# Patient Record
Sex: MALE | Race: WHITE | NOT HISPANIC OR LATINO | Employment: OTHER | URBAN - METROPOLITAN AREA
[De-identification: names, ages, dates, MRNs, and addresses within clinical notes are randomized per-mention and may not be internally consistent; named-entity substitution may affect disease eponyms.]

---

## 2018-04-25 ENCOUNTER — OFFICE VISIT CONVERTED (OUTPATIENT)
Dept: CARDIOLOGY | Facility: CLINIC | Age: 63
End: 2018-04-25
Attending: INTERNAL MEDICINE

## 2018-06-08 ENCOUNTER — OFFICE VISIT CONVERTED (OUTPATIENT)
Dept: OTHER | Facility: HOSPITAL | Age: 63
End: 2018-06-08
Attending: NURSE PRACTITIONER

## 2018-06-08 ENCOUNTER — CONVERSION ENCOUNTER (OUTPATIENT)
Dept: OTHER | Facility: HOSPITAL | Age: 63
End: 2018-06-08

## 2018-08-22 ENCOUNTER — OFFICE VISIT CONVERTED (OUTPATIENT)
Dept: CARDIOLOGY | Facility: CLINIC | Age: 63
End: 2018-08-22
Attending: INTERNAL MEDICINE

## 2018-09-06 ENCOUNTER — CONVERSION ENCOUNTER (OUTPATIENT)
Dept: OTHER | Facility: HOSPITAL | Age: 63
End: 2018-09-06

## 2018-09-06 ENCOUNTER — OFFICE VISIT CONVERTED (OUTPATIENT)
Dept: OTHER | Facility: HOSPITAL | Age: 63
End: 2018-09-06
Attending: NURSE PRACTITIONER

## 2019-02-27 ENCOUNTER — OFFICE VISIT CONVERTED (OUTPATIENT)
Dept: CARDIOLOGY | Facility: CLINIC | Age: 64
End: 2019-02-27
Attending: INTERNAL MEDICINE

## 2019-04-09 VITALS
TEMPERATURE: 98.2 F | DIASTOLIC BLOOD PRESSURE: 62 MMHG | DIASTOLIC BLOOD PRESSURE: 44 MMHG | OXYGEN SATURATION: 99 % | OXYGEN SATURATION: 98 % | DIASTOLIC BLOOD PRESSURE: 58 MMHG | RESPIRATION RATE: 20 BRPM | SYSTOLIC BLOOD PRESSURE: 78 MMHG | RESPIRATION RATE: 15 BRPM | SYSTOLIC BLOOD PRESSURE: 87 MMHG | WEIGHT: 180 LBS | DIASTOLIC BLOOD PRESSURE: 55 MMHG | HEART RATE: 63 BPM | DIASTOLIC BLOOD PRESSURE: 45 MMHG | SYSTOLIC BLOOD PRESSURE: 81 MMHG | HEART RATE: 67 BPM | RESPIRATION RATE: 16 BRPM | HEART RATE: 62 BPM | DIASTOLIC BLOOD PRESSURE: 63 MMHG | SYSTOLIC BLOOD PRESSURE: 92 MMHG | RESPIRATION RATE: 18 BRPM | SYSTOLIC BLOOD PRESSURE: 83 MMHG | DIASTOLIC BLOOD PRESSURE: 47 MMHG | SYSTOLIC BLOOD PRESSURE: 124 MMHG | OXYGEN SATURATION: 100 % | HEART RATE: 59 BPM | SYSTOLIC BLOOD PRESSURE: 116 MMHG | SYSTOLIC BLOOD PRESSURE: 108 MMHG | RESPIRATION RATE: 4 BRPM | HEART RATE: 60 BPM | HEART RATE: 64 BPM | DIASTOLIC BLOOD PRESSURE: 50 MMHG | DIASTOLIC BLOOD PRESSURE: 60 MMHG | DIASTOLIC BLOOD PRESSURE: 48 MMHG | SYSTOLIC BLOOD PRESSURE: 109 MMHG | HEART RATE: 58 BPM | RESPIRATION RATE: 17 BRPM | RESPIRATION RATE: 14 BRPM | SYSTOLIC BLOOD PRESSURE: 73 MMHG | HEART RATE: 61 BPM | TEMPERATURE: 97 F | HEIGHT: 72 IN

## 2019-04-11 ENCOUNTER — AMBULATORY SURGICAL CENTER (AMBULATORY)
Dept: URBAN - METROPOLITAN AREA SURGERY 17 | Facility: SURGERY | Age: 64
End: 2019-04-11
Payer: COMMERCIAL

## 2019-04-11 DIAGNOSIS — K64.8 OTHER HEMORRHOIDS: ICD-10-CM

## 2019-04-11 DIAGNOSIS — D12.5 BENIGN NEOPLASM OF SIGMOID COLON: ICD-10-CM

## 2019-04-11 DIAGNOSIS — K62.1 RECTAL POLYP: ICD-10-CM

## 2019-04-11 DIAGNOSIS — Z86.010 PERSONAL HISTORY OF COLONIC POLYPS: ICD-10-CM

## 2019-04-11 DIAGNOSIS — K57.30 DIVERTICULOSIS OF LARGE INTESTINE WITHOUT PERFORATION OR ABS: ICD-10-CM

## 2019-04-11 DIAGNOSIS — D12.2 BENIGN NEOPLASM OF ASCENDING COLON: ICD-10-CM

## 2019-04-11 LAB
GI HISTOLOGY: A. UNSPECIFIED: (no result)
GI HISTOLOGY: B. UNSPECIFIED: (no result)
GI HISTOLOGY: C. UNSPECIFIED: (no result)
GI HISTOLOGY: D. UNSPECIFIED: (no result)
GI HISTOLOGY: PDF REPORT: (no result)

## 2019-04-11 PROCEDURE — 45380 COLONOSCOPY AND BIOPSY: CPT | Mod: 59

## 2019-04-11 PROCEDURE — 45385 COLONOSCOPY W/LESION REMOVAL: CPT

## 2019-06-17 ENCOUNTER — OFFICE VISIT CONVERTED (OUTPATIENT)
Dept: OTHER | Facility: HOSPITAL | Age: 64
End: 2019-06-17
Attending: NURSE PRACTITIONER

## 2019-06-17 ENCOUNTER — HOSPITAL ENCOUNTER (OUTPATIENT)
Dept: OTHER | Facility: HOSPITAL | Age: 64
Discharge: HOME | End: 2019-06-17

## 2019-06-17 LAB
25(OH)D3 SERPL-MCNC: 27.4 NG/ML (ref 30–100)
ALBUMIN SERPL-MCNC: 4.4 G/DL (ref 3.5–5)
ALBUMIN/GLOB SERPL: 1.7 {RATIO} (ref 1.4–2.6)
ALP SERPL-CCNC: 86 U/L (ref 56–155)
ALT SERPL-CCNC: 24 U/L (ref 10–40)
ANION GAP SERPL CALC-SCNC: 15 MMOL/L (ref 8–19)
AST SERPL-CCNC: 20 U/L (ref 15–50)
BASOPHILS # BLD AUTO: 0.04 10*3/UL (ref 0–0.2)
BASOPHILS NFR BLD AUTO: 0.6 % (ref 0–3)
BILIRUB SERPL-MCNC: 0.37 MG/DL (ref 0.2–1.3)
BUN SERPL-MCNC: 13 MG/DL (ref 5–25)
BUN/CREAT SERPL: 19 {RATIO} (ref 6–20)
CALCIUM SERPL-MCNC: 9.1 MG/DL (ref 8.7–10.4)
CHLORIDE SERPL-SCNC: 102 MMOL/L (ref 99–111)
CHOLEST SERPL-MCNC: 139 MG/DL (ref 107–200)
CHOLEST/HDLC SERPL: 3.7 {RATIO} (ref 3–6)
CONV ABS IMM GRAN: 0.02 10*3/UL (ref 0–0.2)
CONV CO2: 26 MMOL/L (ref 22–32)
CONV IMMATURE GRAN: 0.3 % (ref 0–1.8)
CONV TOTAL PROTEIN: 7 G/DL (ref 6.3–8.2)
CREAT UR-MCNC: 0.7 MG/DL (ref 0.7–1.2)
DEPRECATED RDW RBC AUTO: 41.8 FL (ref 35.1–43.9)
EOSINOPHIL # BLD AUTO: 0.39 10*3/UL (ref 0–0.7)
EOSINOPHIL # BLD AUTO: 6 % (ref 0–7)
ERYTHROCYTE [DISTWIDTH] IN BLOOD BY AUTOMATED COUNT: 12.7 % (ref 11.6–14.4)
GFR SERPLBLD BASED ON 1.73 SQ M-ARVRAT: >60 ML/MIN/{1.73_M2}
GLOBULIN UR ELPH-MCNC: 2.6 G/DL (ref 2–3.5)
GLUCOSE SERPL-MCNC: 163 MG/DL (ref 70–99)
HBA1C MFR BLD: 13 G/DL (ref 14–18)
HCT VFR BLD AUTO: 40.8 % (ref 42–52)
HDLC SERPL-MCNC: 38 MG/DL (ref 40–60)
LDLC SERPL CALC-MCNC: 73 MG/DL (ref 70–100)
LYMPHOCYTES # BLD AUTO: 1.69 10*3/UL (ref 1–5)
MCH RBC QN AUTO: 28.7 PG (ref 27–31)
MCHC RBC AUTO-ENTMCNC: 31.9 G/DL (ref 33–37)
MCV RBC AUTO: 90.1 FL (ref 80–96)
MONOCYTES # BLD AUTO: 0.52 10*3/UL (ref 0.2–1.2)
MONOCYTES NFR BLD AUTO: 8 % (ref 3–10)
NEUTROPHILS # BLD AUTO: 3.87 10*3/UL (ref 2–8)
NEUTROPHILS NFR BLD AUTO: 59.2 % (ref 30–85)
NRBC CBCN: 0 % (ref 0–0.7)
OSMOLALITY SERPL CALC.SUM OF ELEC: 292 MOSM/KG (ref 273–304)
PLATELET # BLD AUTO: 289 10*3/UL (ref 130–400)
PMV BLD AUTO: 11.6 FL (ref 9.4–12.4)
POTASSIUM SERPL-SCNC: 4.3 MMOL/L (ref 3.5–5.3)
RBC # BLD AUTO: 4.53 10*6/UL (ref 4.7–6.1)
SODIUM SERPL-SCNC: 139 MMOL/L (ref 135–147)
TRIGL SERPL-MCNC: 141 MG/DL (ref 40–150)
TSH SERPL-ACNC: 1.49 M[IU]/L (ref 0.27–4.2)
VARIANT LYMPHS NFR BLD MANUAL: 25.9 % (ref 20–45)
VLDLC SERPL-MCNC: 28 MG/DL (ref 5–37)
WBC # BLD AUTO: 6.53 10*3/UL (ref 4.8–10.8)

## 2019-06-18 LAB
EST. AVERAGE GLUCOSE BLD GHB EST-MCNC: 105 MG/DL
HBA1C MFR BLD: 5.3 % (ref 3.5–5.7)

## 2019-11-27 ENCOUNTER — OFFICE VISIT CONVERTED (OUTPATIENT)
Dept: CARDIOLOGY | Facility: CLINIC | Age: 64
End: 2019-11-27
Attending: INTERNAL MEDICINE

## 2019-11-27 ENCOUNTER — CONVERSION ENCOUNTER (OUTPATIENT)
Dept: CARDIOLOGY | Facility: CLINIC | Age: 64
End: 2019-11-27

## 2020-01-07 ENCOUNTER — OFFICE VISIT CONVERTED (OUTPATIENT)
Dept: OTHER | Facility: HOSPITAL | Age: 65
End: 2020-01-07
Attending: NURSE PRACTITIONER

## 2020-01-07 ENCOUNTER — CONVERSION ENCOUNTER (OUTPATIENT)
Dept: OTHER | Facility: HOSPITAL | Age: 65
End: 2020-01-07

## 2020-11-30 ENCOUNTER — OFFICE VISIT CONVERTED (OUTPATIENT)
Dept: CARDIOLOGY | Facility: CLINIC | Age: 65
End: 2020-11-30
Attending: INTERNAL MEDICINE

## 2020-11-30 ENCOUNTER — CONVERSION ENCOUNTER (OUTPATIENT)
Dept: CARDIOLOGY | Facility: CLINIC | Age: 65
End: 2020-11-30

## 2021-05-13 NOTE — PROGRESS NOTES
Progress Note      Patient Name: Holly Nogueira   Patient ID: [id number]   Sex: Male   YOB: 1955    Primary Care Provider: lAejandra SIMPSON   Referring Provider: Sowmya SIMPSON    Visit Date: November 30, 2020    Provider: Diallo Novak MD   [facility]   Location Address: [address] [place] [address], [address]   SAMANTA Garcia  [id number]   Location Phone: [phone]          Chief Complaint  · Dizziness       History Of Present Illness  REFERRING PROVIDER: Sowmya SIMPSON   Holly Nogueira is a 65-year-old white male who states he had episodes of dizziness when his blood pressure went low so his Losartan was decreased about a month ago. The dizziness lasted about a week a nd he has not had any episodes since his blood pressure increased. He states it was running 150/50 range. Not it runs less than 140 systolic range. Denies any chest pain o pressure. No palpitations, shortness of breath. Has occasional swelling, but no syncope, PND, or orthopnea.   PAST MEDICAL HISTORY: Hypertension; prostate cancer; bleeding tendencies.   PSYCHOSOCIAL HISTORY: Rare alcohol use. Previously smoked but quit.   CURRENT MEDICATIONS: Carvedilol 12.5 mg 1-1/2 tablets b.i.d.; Losartan 100 mg 1/2 tablet daily; Lasix 20 mg daily; Nifedipine 90 mg daily; Pentoxifylline 400 mg t.i.d.; Atorvastatin 20 mg daily; Cialis 5 mg daily; vitamin D 2000 units daily; Cetirizine 10 mg daily; Ibuprofen 200 mg daily.      ALLERGIES:  No known drug allergies.       Review of Systems  · Cardiovascular  o Admits  o : swelling (feet, ankles, hands)  o Denies  o : palpitations (fast, fluttering, or skipping beats), shortness of breath while walking or lying flat, chest pain or angina pectoris   · Respiratory  o Denies  o : chronic or frequent cough      Vitals  Date Time BP Position Site L\R Cuff Size HR RR TEMP (F) WT  HT  BMI kg/m2 BSA m2 O2 Sat FR L/min FiO2        11/30/2020 08:08 /76 Sitting    66 - R    195lbs 16oz 6'   26.58 2.13             Physical Examination  · Constitutional  o Appearance  o : Awake, alert, in no acute distress.  · Eyes  o Conjunctivae  o : Conjunctivae normal.  · Ears, Nose, Mouth and Throat  o Oral Cavity  o :   § Oral Mucosa  § : Normal.  · Neck  o Jugular Veins  o : No JVD. Good carotid upstroke. No bruits noted.  · Respiratory  o Respiratory  o : Good respiratory effort. Clear to percussion and auscultation.  · Cardiovascular  o Heart  o : PMI is not well felt. S1, S2 regular. No S3. S4+. There is a 1/6 systolic murmur at the apex. Negative diastolic murmur. No ectopic beats.  o Peripheral Vascular System  o :   § Extremities  § : Good femoral and pedal pulses. No pedal edema.  · Gastrointestinal  o Abdominal Examination  o : Soft. No tenderness or masses felt. No hepatosplenomegaly. Abdominal aorta is not palpable.  · EKG  o Indications  o : Dizziness, dizziness, hypertension.   o Results  o : Sinus rhythm at a rate of 60.   o Comparison  o : No change from EKG from November 2019.   · Labs  o Labs  o : Blood sugar 102, total cholesterol 151, triglyceride 88,LDL 85, LDL 49.           Assessment     1.  Dizziness, resolved.   2.  Hypertension, fair control.  3.  Hyperlipidemia, at goal for risk status.         Plan     1.  Make his Losartan back to 100 mg once a day.   2.  Decrease Nifedipine down to 60 mg to help with the swelling.  3.  Do a blood pressure log and we will adjust hypertensive meds if needed.  4.  Followup in 9 months or as needed.       TATIANA Segovia/Diallo Novak MD, Merged with [facility]  WAYNE/mario    This note was transcribed by Kiley Bradley. WAYNE/mario  The above service was transcribed by Kiley Bradley on my behalf and I attest to the accuracy of the note.  ELIDIA/PM             Electronically Signed by: Tia Bradley-, Other -Author on December 3, 2020 03:12:01 PM  Electronically Co-signed by: TATIANA Burgess -Reviewer on December 7, 2020 07:44:53  AM  Electronically Co-signed by: Diallo Novak MD -Reviewer on December 18, 2020 05:33:44 PM

## 2021-05-14 VITALS
HEIGHT: 72 IN | SYSTOLIC BLOOD PRESSURE: 146 MMHG | WEIGHT: 196 LBS | HEART RATE: 66 BPM | DIASTOLIC BLOOD PRESSURE: 76 MMHG | BODY MASS INDEX: 26.55 KG/M2

## 2021-05-15 VITALS
BODY MASS INDEX: 25.47 KG/M2 | DIASTOLIC BLOOD PRESSURE: 64 MMHG | TEMPERATURE: 98.6 F | WEIGHT: 188 LBS | HEIGHT: 72 IN | RESPIRATION RATE: 16 BRPM | OXYGEN SATURATION: 99 % | SYSTOLIC BLOOD PRESSURE: 104 MMHG | HEART RATE: 70 BPM

## 2021-05-15 VITALS
BODY MASS INDEX: 26.04 KG/M2 | DIASTOLIC BLOOD PRESSURE: 64 MMHG | HEIGHT: 71 IN | WEIGHT: 186 LBS | HEART RATE: 64 BPM | SYSTOLIC BLOOD PRESSURE: 120 MMHG

## 2021-05-15 VITALS
OXYGEN SATURATION: 98 % | BODY MASS INDEX: 25.06 KG/M2 | HEIGHT: 72 IN | SYSTOLIC BLOOD PRESSURE: 118 MMHG | WEIGHT: 185 LBS | RESPIRATION RATE: 18 BRPM | TEMPERATURE: 98.3 F | HEART RATE: 71 BPM | DIASTOLIC BLOOD PRESSURE: 58 MMHG

## 2021-05-15 VITALS
DIASTOLIC BLOOD PRESSURE: 64 MMHG | WEIGHT: 184 LBS | BODY MASS INDEX: 24.92 KG/M2 | SYSTOLIC BLOOD PRESSURE: 124 MMHG | HEART RATE: 60 BPM | HEIGHT: 72 IN

## 2021-05-16 VITALS
HEART RATE: 94 BPM | WEIGHT: 201 LBS | HEIGHT: 72 IN | DIASTOLIC BLOOD PRESSURE: 80 MMHG | BODY MASS INDEX: 27.22 KG/M2 | SYSTOLIC BLOOD PRESSURE: 142 MMHG

## 2021-05-16 VITALS
HEART RATE: 80 BPM | SYSTOLIC BLOOD PRESSURE: 122 MMHG | WEIGHT: 203 LBS | HEIGHT: 72 IN | BODY MASS INDEX: 27.5 KG/M2 | DIASTOLIC BLOOD PRESSURE: 60 MMHG | OXYGEN SATURATION: 99 % | TEMPERATURE: 98.7 F | RESPIRATION RATE: 18 BRPM

## 2021-05-16 VITALS
HEART RATE: 80 BPM | DIASTOLIC BLOOD PRESSURE: 74 MMHG | BODY MASS INDEX: 27.22 KG/M2 | SYSTOLIC BLOOD PRESSURE: 128 MMHG | HEIGHT: 72 IN | WEIGHT: 201 LBS

## 2021-05-16 VITALS
HEART RATE: 79 BPM | HEIGHT: 71 IN | DIASTOLIC BLOOD PRESSURE: 62 MMHG | OXYGEN SATURATION: 98 % | RESPIRATION RATE: 16 BRPM | SYSTOLIC BLOOD PRESSURE: 124 MMHG | WEIGHT: 201 LBS | BODY MASS INDEX: 28.14 KG/M2 | TEMPERATURE: 98.3 F

## 2021-08-26 PROBLEM — I10 HYPERTENSION, ESSENTIAL: Chronic | Status: ACTIVE | Noted: 2021-08-26

## 2021-08-26 PROBLEM — I10 HYPERTENSION, ESSENTIAL: Status: ACTIVE | Noted: 2021-08-26

## 2021-08-30 ENCOUNTER — OFFICE VISIT (OUTPATIENT)
Dept: CARDIOLOGY | Facility: CLINIC | Age: 66
End: 2021-08-30

## 2021-08-30 VITALS
DIASTOLIC BLOOD PRESSURE: 66 MMHG | WEIGHT: 195 LBS | HEART RATE: 63 BPM | BODY MASS INDEX: 26.41 KG/M2 | SYSTOLIC BLOOD PRESSURE: 144 MMHG | HEIGHT: 72 IN

## 2021-08-30 DIAGNOSIS — E78.5 HYPERLIPIDEMIA, UNSPECIFIED HYPERLIPIDEMIA TYPE: ICD-10-CM

## 2021-08-30 DIAGNOSIS — I10 HYPERTENSION, ESSENTIAL: Primary | Chronic | ICD-10-CM

## 2021-08-30 PROCEDURE — 99214 OFFICE O/P EST MOD 30 MIN: CPT | Performed by: NURSE PRACTITIONER

## 2021-08-30 RX ORDER — IBUPROFEN 200 MG
200 TABLET ORAL EVERY 6 HOURS PRN
COMMUNITY
End: 2022-03-07 | Stop reason: ALTCHOICE

## 2021-08-30 RX ORDER — LOSARTAN POTASSIUM 100 MG/1
100 TABLET ORAL DAILY
Qty: 90 TABLET | Refills: 3 | Status: SHIPPED | OUTPATIENT
Start: 2021-08-30 | End: 2022-09-12 | Stop reason: ALTCHOICE

## 2021-08-30 RX ORDER — TADALAFIL 5 MG/1
5 TABLET ORAL AS NEEDED
COMMUNITY

## 2021-08-30 RX ORDER — FUROSEMIDE 20 MG/1
20 TABLET ORAL DAILY
Qty: 90 TABLET | Refills: 3 | Status: SHIPPED | OUTPATIENT
Start: 2021-08-30 | End: 2022-03-07

## 2021-08-30 RX ORDER — ATORVASTATIN CALCIUM 20 MG/1
20 TABLET, FILM COATED ORAL DAILY
COMMUNITY
End: 2022-09-12 | Stop reason: SDUPTHER

## 2021-08-30 RX ORDER — CARVEDILOL 6.25 MG/1
6.25 TABLET ORAL 2 TIMES DAILY
COMMUNITY
Start: 2021-06-01 | End: 2022-03-07

## 2021-08-30 RX ORDER — CETIRIZINE HYDROCHLORIDE 10 MG/1
10 TABLET ORAL DAILY
COMMUNITY
End: 2022-03-07

## 2021-08-30 RX ORDER — NIFEDIPINE 60 MG/1
60 TABLET, EXTENDED RELEASE ORAL DAILY
COMMUNITY

## 2021-08-30 RX ORDER — HYDRALAZINE HYDROCHLORIDE 25 MG/1
25 TABLET, FILM COATED ORAL 2 TIMES DAILY
Qty: 180 TABLET | Refills: 3 | Status: SHIPPED | OUTPATIENT
Start: 2021-08-30 | End: 2022-09-12 | Stop reason: ALTCHOICE

## 2021-08-30 RX ORDER — CARVEDILOL 12.5 MG/1
12.5 TABLET ORAL 2 TIMES DAILY
COMMUNITY
Start: 2021-06-01 | End: 2022-03-07 | Stop reason: SDUPTHER

## 2021-08-30 RX ORDER — LOSARTAN POTASSIUM 50 MG/1
100 TABLET ORAL DAILY
COMMUNITY
End: 2021-08-30 | Stop reason: SDUPTHER

## 2021-08-30 NOTE — ASSESSMENT & PLAN NOTE
Needs tighter control.  Start hydralazine 25 mg twice daily.  Continue nifedipine 90 mg once a day, losartan 100 mg once a day, furosemide 20 mg once a day, carvedilol 6.25 twice daily and carvedilol 12.5 twice daily.  He will do another blood pressure log will adjust meds as needed

## 2021-08-30 NOTE — PROGRESS NOTES
Chief Complaint  Follow-up (Post labs) and Hypertension    Subjective            Holly Nogueira presents to [facility]  66-year-old white male comes in continues to be short of breath mostly when he exerts himself.  It is mild and long-term for him.  His blood pressures are starting to go up so he went back on his old dose of 90 mg of nifedipine.  He does not think that the swelling is worse.  He has not had any recent episodes of dizziness.  Denies chest pain, palpitations, syncope, PND, and orthopnea.              Past History:    Past Medical History:   Diagnosis Date   • Alcohol abuse 1998   • Bilateral lower extremity edema    • Broken bones    • Erectile dysfunction 06/18/2019   • Hypertension    • Hypertension, essential 8/26/2021   • Mixed hyperlipidemia    • Peyronie's disease 06/18/2019   • Prostate cancer (CMS/HCC)    • Renal cyst, left    • Sinus trouble    • Vitamin D deficiency         Family History: family history includes Alcohol abuse in an other family member; Cirrhosis in his brother; Heart disease in his mother; Stroke in his mother.     Social History: reports that he has quit smoking. He smoked 2.00 packs per day. He has never used smokeless tobacco. He reports previous alcohol use. He reports that he does not use drugs.    Allergies: Patient has no known allergies.      Past Surgical History:   Procedure Laterality Date   • ELBOW ARTHROSCOPY Left         Prior to Admission medications    Medication Sig Start Date End Date Taking? Authorizing Provider   atorvastatin (LIPITOR) 20 MG tablet Take 20 mg by mouth Daily.   Yes Shama Zhu MD   carvedilol (COREG) 12.5 MG tablet Take 12.5 mg by mouth 2 (two) times a day. 6/1/21  Yes Shama Zhu MD   carvedilol (COREG) 6.25 MG tablet Take 6.25 mg by mouth 2 (two) times a day. 6/1/21  Yes Shama Zhu MD   cetirizine (zyrTEC) 10 MG tablet Take 10 mg by mouth Daily.   Yes Shama Zhu MD  "  ibuprofen (ADVIL,MOTRIN) 200 MG tablet Take 200 mg by mouth Every 6 (Six) Hours As Needed for Mild Pain .   Yes Shama Zhu MD   losartan (COZAAR) 50 MG tablet Take 100 mg by mouth Daily.   Yes Shama Zhu MD   NIFEdipine XL (PROCARDIA XL) 90 MG 24 hr tablet Take 90 mg by mouth Daily.   Yes Shama Zhu MD   tadalafil (Cialis) 5 MG tablet Take 5 mg by mouth As Needed.   Yes Shama Zhu MD   VITAMIN D PO Take  by mouth.   Yes ProviderShama MD        Review of Systems   Respiratory: Positive for shortness of breath (With activity).    All other systems reviewed and are negative.       Objective     Physical Exam  Constitutional:       Appearance: Normal appearance. He is normal weight.   Neck:      Vascular: No carotid bruit.   Cardiovascular:      Rate and Rhythm: Normal rate and regular rhythm.      Chest Wall: PMI is displaced.      Heart sounds: Murmur (At the apex) heard.   Systolic murmur is present with a grade of 1/6.   No S3 or S4 sounds.    Pulmonary:      Effort: Pulmonary effort is normal.      Breath sounds: Normal breath sounds.   Abdominal:      General: Bowel sounds are normal.      Palpations: Abdomen is soft.   Musculoskeletal:      Right lower leg: No edema.      Left lower leg: No edema.   Neurological:      Mental Status: He is alert.       /66   Pulse 63   Ht 182.9 cm (72\")   Wt 88.5 kg (195 lb)   BMI 26.45 kg/m²       Vitals:    08/30/21 0813   BP: 144/66   Pulse: 63       Result Review :         The following data was reviewed by: TATIANA Love on 08/30/2021:      No results found for: PROBNP, BNP          Lab Results   Component Value Date    TSH 1.490 06/17/2019      Labs on 8/23 total cholesterol is 165 triglycerides 98 HDL 50 LDL at 96 sodium is slightly low at 135                      Assessment and Plan        Diagnoses and all orders for this visit:    1. Hypertension, essential (Primary)  Assessment & Plan:  Needs " tighter control.  Start hydralazine 25 mg twice daily.  Continue nifedipine 90 mg once a day, losartan 100 mg once a day, furosemide 20 mg once a day, carvedilol 6.25 twice daily and carvedilol 12.5 twice daily.  He will do another blood pressure log will adjust meds as needed    Orders:  -     losartan (COZAAR) 100 MG tablet; Take 1 tablet by mouth Daily.  Dispense: 90 tablet; Refill: 3  -     furosemide (LASIX) 20 MG tablet; Take 1 tablet by mouth Daily.  Dispense: 90 tablet; Refill: 3  -     hydrALAZINE (APRESOLINE) 25 MG tablet; Take 1 tablet by mouth 2 (Two) Times a Day.  Dispense: 180 tablet; Refill: 3  -     Comprehensive Metabolic Panel; Future  -     Magnesium; Future    2. Hyperlipidemia, unspecified hyperlipidemia type  -     Lipid Panel; Future  -     Comprehensive Metabolic Panel; Future          Follow Up     Return in about 6 months (around 2/28/2022) for EKG on next visit, with Dr. Novak, With external labs.    Patient was given instructions and counseling regarding his condition or for health maintenance advice. Please see specific information pulled into the AVS if appropriate.       TATIANA Segovia  08/30/21 08:22 EDT

## 2022-03-07 ENCOUNTER — OFFICE VISIT (OUTPATIENT)
Dept: CARDIOLOGY | Facility: CLINIC | Age: 67
End: 2022-03-07

## 2022-03-07 VITALS
HEART RATE: 79 BPM | HEIGHT: 72 IN | BODY MASS INDEX: 26.55 KG/M2 | SYSTOLIC BLOOD PRESSURE: 137 MMHG | DIASTOLIC BLOOD PRESSURE: 66 MMHG | WEIGHT: 196 LBS

## 2022-03-07 DIAGNOSIS — I10 HYPERTENSION, ESSENTIAL: Primary | Chronic | ICD-10-CM

## 2022-03-07 DIAGNOSIS — E78.5 HYPERLIPIDEMIA, UNSPECIFIED HYPERLIPIDEMIA TYPE: ICD-10-CM

## 2022-03-07 PROCEDURE — 99214 OFFICE O/P EST MOD 30 MIN: CPT | Performed by: INTERNAL MEDICINE

## 2022-03-07 PROCEDURE — 93000 ELECTROCARDIOGRAM COMPLETE: CPT | Performed by: INTERNAL MEDICINE

## 2022-03-07 RX ORDER — CARVEDILOL 25 MG/1
25 TABLET ORAL 2 TIMES DAILY WITH MEALS
Qty: 180 TABLET | Refills: 3 | Status: SHIPPED | OUTPATIENT
Start: 2022-03-07 | End: 2022-09-12 | Stop reason: SDUPTHER

## 2022-03-07 RX ORDER — PENTOXIFYLLINE 400 MG/1
TABLET, EXTENDED RELEASE ORAL
COMMUNITY
Start: 2022-03-05

## 2022-03-07 NOTE — ASSESSMENT & PLAN NOTE
Hypertension needs to be tighter control.  I am going to increase his carvedilol to 25 mg twice daily and change his furosemide to Aldactazide 25-25 once a day.  He will do a blood pressure log for 2 weeks starting next Monday and also before his next visit in 6 months.  In the interim, he will continue the hydralazine losartan and nifedipine in the current dosage

## 2022-03-07 NOTE — PROGRESS NOTES
Office Visit    Chief Complaint  Hyperlipidemia and Hypertension    Subjective            Holly Nogueira presents to [facility]  Mr. Fair is a 66 years old gentleman with hypertension hyperlipidemia who is doing reasonably well.  He denies chest pain shortness of breath palpitations dizziness syncope.  At home he checks his blood pressures quite often but forgot to bring it to us.  They do run a little high according to him.      Past Medical History:   Diagnosis Date   • Alcohol abuse 1998   • Bilateral lower extremity edema    • Broken bones    • Erectile dysfunction 06/18/2019   • Hypertension    • Hypertension, essential 8/26/2021   • Mixed hyperlipidemia    • Peyronie's disease 06/18/2019   • Prostate cancer (HCC)    • Renal cyst, left    • Sinus trouble    • Vitamin D deficiency        No Known Allergies     Past Surgical History:   Procedure Laterality Date   • ELBOW ARTHROSCOPY Left         Social History     Tobacco Use   • Smoking status: Former Smoker     Packs/day: 2.00     Types: Cigarettes   • Smokeless tobacco: Never Used   • Tobacco comment: Smoked 30 years   Vaping Use   • Vaping Use: Never used   Substance Use Topics   • Alcohol use: Not Currently     Comment: Former   • Drug use: Never       Family History   Problem Relation Age of Onset   • Stroke Mother    • Heart disease Mother    • Cirrhosis Brother         Cirrosis of liver   • Alcohol abuse Other         Alcoholism in family        Prior to Admission medications    Medication Sig Start Date End Date Taking? Authorizing Provider   Acetaminophen (TYLENOL ARTHRITIS PAIN PO) Take  by mouth.   Yes ProviderShama MD   atorvastatin (LIPITOR) 20 MG tablet Take 20 mg by mouth Daily.   Yes Shama Zhu MD   carvedilol (COREG) 12.5 MG tablet Take 12.5 mg by mouth 2 (two) times a day. 6/1/21  Yes Shama Zhu MD   carvedilol (COREG) 6.25 MG tablet Take 6.25 mg by mouth 2 (two) times a day. 6/1/21  " Yes Shama Zhu MD   furosemide (LASIX) 20 MG tablet Take 1 tablet by mouth Daily. 8/30/21  Yes Elza Nash APRN   hydrALAZINE (APRESOLINE) 25 MG tablet Take 1 tablet by mouth 2 (Two) Times a Day. 8/30/21  Yes Elza Nash ZakiaTATIANA   losartan (COZAAR) 100 MG tablet Take 1 tablet by mouth Daily. 8/30/21  Yes Elza Nash Zakia, TATIANA   NIFEdipine XL (PROCARDIA XL) 60 MG 24 hr tablet Take 60 mg by mouth Daily.   Yes Shama Zhu MD   pentoxifylline (TRENtal) 400 MG CR tablet  3/5/22  Yes Shama Zhu MD   tadalafil (CIALIS) 5 MG tablet Take 5 mg by mouth As Needed.   Yes Shama Zhu MD   VITAMIN D PO Take  by mouth.   Yes Shama Zhu MD   cetirizine (zyrTEC) 10 MG tablet Take 10 mg by mouth Daily.    Shama Zhu MD   ibuprofen (ADVIL,MOTRIN) 200 MG tablet Take 200 mg by mouth Every 6 (Six) Hours As Needed for Mild Pain .    ProviderShama MD        Review of Systems   Respiratory: Negative for chest tightness and shortness of breath.    Cardiovascular: Negative for chest pain, palpitations and leg swelling.   Neurological: Negative for dizziness, syncope, weakness, light-headedness and numbness.        Objective     /66   Pulse 79   Ht 182.9 cm (72\")   Wt 88.9 kg (196 lb)   BMI 26.58 kg/m²       Physical Exam  Constitutional:       General: He is awake.      Appearance: Normal appearance.   Neck:      Thyroid: No thyromegaly.      Vascular: No carotid bruit or JVD.   Cardiovascular:      Rate and Rhythm: Normal rate and regular rhythm.      Chest Wall: PMI is not displaced.      Pulses: Normal pulses.      Heart sounds: Normal heart sounds, S1 normal and S2 normal. No murmur heard.    No friction rub. No gallop. No S3 or S4 sounds.   Pulmonary:      Effort: Pulmonary effort is normal.      Breath sounds: Normal breath sounds and air entry. No wheezing, rhonchi or rales.   Abdominal:      General: Bowel sounds are normal.      " Palpations: Abdomen is soft. There is no mass.      Tenderness: There is no abdominal tenderness.   Musculoskeletal:      Cervical back: Neck supple.      Right lower leg: No edema.      Left lower leg: No edema.   Neurological:      Mental Status: He is alert and oriented to person, place, and time.   Psychiatric:         Mood and Affect: Mood normal.         Behavior: Behavior is cooperative.       No results found for: PROBNP, BNP          Lab Results   Component Value Date    TSH 1.490 06/17/2019      No results found for: FREET4   No results found for: DDIMERQUANT  No results found for: MG   No results found for: DIGOXIN              Result Review :                    ECG 12 Lead    Date/Time: 3/7/2022 12:03 PM  Performed by: Diallo Novak MD  Authorized by: Diallo Novak MD   Comments: Sinus rhythm normal axis no significant abnormality noted.  No change compared to previous EKG                Assessment and Plan        Diagnoses and all orders for this visit:    1. Hypertension, essential (Primary)  Assessment & Plan:  Hypertension needs to be tighter control.  I am going to increase his carvedilol to 25 mg twice daily and change his furosemide to Aldactazide 25-25 once a day.  He will do a blood pressure log for 2 weeks starting next Monday and also before his next visit in 6 months.  In the interim, he will continue the hydralazine losartan and nifedipine in the current dosage    Orders:  -     Lipid Panel; Future  -     Comprehensive Metabolic Panel; Future  -     Magnesium; Future  -     Basic Metabolic Panel; Future  -     Lipid Panel; Future  -     Comprehensive Metabolic Panel; Future  -     Magnesium; Future    2. Hyperlipidemia, unspecified hyperlipidemia type  Assessment & Plan:  We do not have a current lipid profile on him.  We will have him get it in 2 to 3 weeks when he gets his chemistry panel checked due to changes in diuretic    Orders:  -     Lipid Panel; Future  -     Comprehensive  Metabolic Panel; Future  -     Magnesium; Future  -     Basic Metabolic Panel; Future  -     Lipid Panel; Future  -     Comprehensive Metabolic Panel; Future  -     Magnesium; Future    Other orders  -     carvedilol (COREG) 25 MG tablet; Take 1 tablet by mouth 2 (Two) Times a Day With Meals.  Dispense: 180 tablet; Refill: 3  -     spironolactone 25 MG tablet 25 mg, hydroCHLOROthiazide 25 MG tablet 25 mg; Take 1 tablet by mouth Daily.          Follow Up     Return in about 6 months (around 9/7/2022) for next ov June, Labs  outside lab ordered.   print all the blood work orders in 3 weeks and in 6 mo..    Patient was given instructions and counseling regarding his condition or for health maintenance advice. Please see specific information pulled into the AVS if appropriate.     Diallo Novak MD  03/07/22 09:16 EST

## 2022-03-07 NOTE — ASSESSMENT & PLAN NOTE
We do not have a current lipid profile on him.  We will have him get it in 2 to 3 weeks when he gets his chemistry panel checked due to changes in diuretic

## 2022-03-14 VITALS
WEIGHT: 180 LBS | RESPIRATION RATE: 12 BRPM | SYSTOLIC BLOOD PRESSURE: 109 MMHG | SYSTOLIC BLOOD PRESSURE: 99 MMHG | OXYGEN SATURATION: 98 % | SYSTOLIC BLOOD PRESSURE: 145 MMHG | SYSTOLIC BLOOD PRESSURE: 105 MMHG | DIASTOLIC BLOOD PRESSURE: 58 MMHG | HEART RATE: 70 BPM | SYSTOLIC BLOOD PRESSURE: 107 MMHG | OXYGEN SATURATION: 99 % | HEART RATE: 69 BPM | HEART RATE: 67 BPM | SYSTOLIC BLOOD PRESSURE: 102 MMHG | TEMPERATURE: 97 F | SYSTOLIC BLOOD PRESSURE: 101 MMHG | RESPIRATION RATE: 20 BRPM | SYSTOLIC BLOOD PRESSURE: 119 MMHG | DIASTOLIC BLOOD PRESSURE: 56 MMHG | HEIGHT: 72 IN | SYSTOLIC BLOOD PRESSURE: 150 MMHG | OXYGEN SATURATION: 96 % | SYSTOLIC BLOOD PRESSURE: 126 MMHG | DIASTOLIC BLOOD PRESSURE: 72 MMHG | DIASTOLIC BLOOD PRESSURE: 66 MMHG | HEART RATE: 66 BPM | DIASTOLIC BLOOD PRESSURE: 57 MMHG | DIASTOLIC BLOOD PRESSURE: 59 MMHG | HEART RATE: 65 BPM | HEART RATE: 68 BPM | HEART RATE: 78 BPM | HEART RATE: 64 BPM | DIASTOLIC BLOOD PRESSURE: 61 MMHG | DIASTOLIC BLOOD PRESSURE: 77 MMHG | TEMPERATURE: 97.2 F | HEART RATE: 73 BPM

## 2022-03-15 ENCOUNTER — AMBULATORY SURGICAL CENTER (AMBULATORY)
Dept: URBAN - METROPOLITAN AREA SURGERY 17 | Facility: SURGERY | Age: 67
End: 2022-03-15

## 2022-03-15 ENCOUNTER — OFFICE (AMBULATORY)
Dept: URBAN - METROPOLITAN AREA PATHOLOGY 4 | Facility: PATHOLOGY | Age: 67
End: 2022-03-15

## 2022-03-15 DIAGNOSIS — K57.30 DIVERTICULOSIS OF LARGE INTESTINE WITHOUT PERFORATION OR ABS: ICD-10-CM

## 2022-03-15 DIAGNOSIS — K62.1 RECTAL POLYP: ICD-10-CM

## 2022-03-15 DIAGNOSIS — Z86.010 PERSONAL HISTORY OF COLONIC POLYPS: ICD-10-CM

## 2022-03-15 DIAGNOSIS — D12.2 BENIGN NEOPLASM OF ASCENDING COLON: ICD-10-CM

## 2022-03-15 DIAGNOSIS — K64.8 OTHER HEMORRHOIDS: ICD-10-CM

## 2022-03-15 LAB
GI HISTOLOGY: A. UNSPECIFIED: (no result)
GI HISTOLOGY: B. UNSPECIFIED: (no result)
GI HISTOLOGY: C. UNSPECIFIED: (no result)
GI HISTOLOGY: PDF REPORT: (no result)

## 2022-03-15 PROCEDURE — 88305 TISSUE EXAM BY PATHOLOGIST: CPT | Performed by: INTERNAL MEDICINE

## 2022-03-15 PROCEDURE — 45380 COLONOSCOPY AND BIOPSY: CPT | Mod: 59,PT | Performed by: INTERNAL MEDICINE

## 2022-03-15 PROCEDURE — 45385 COLONOSCOPY W/LESION REMOVAL: CPT | Mod: PT | Performed by: INTERNAL MEDICINE

## 2022-03-15 NOTE — SERVICENOTES
The patient was informed that this exam is not 100% accurate, depending on many factors (such as the preparation). Small lesions can be missed. Please call if symptoms persist or new symptoms develop. The right colon was examined twice. Discussed findings/plan with Jose Miguel

## 2022-03-15 NOTE — SERVICEHPINOTES
DOMINICK ROCHA  is a  66  male   who presents today for a  Colonoscopy   for   the indications listed below. The updated Patient Profile was reviewed prior to the procedure, in conjunction with the Physical Exam, including medical conditions, surgical procedures, medications, allergies, family history and social history. See Physical Exam time stamp below for date and time of HPI completion.Pre-operatively, I reviewed the indication(s) for the procedure, the risks of the procedure [including but not limited to: unexpected bleeding possibly requiring hospitalization and/or unplanned repeat procedures, perforation possibly requiring surgical treatment, missed lesions and complications of sedation/MAC (also explained by anesthesia staff)]. I have evaluated the patient for risks associated with the planned anesthesia and the procedure to be performed and find the patient an acceptable candidate for IV sedation.Multiple opportunities were provided for any questions or concerns, and all questions were answered satisfactorily before any anesthesia was administered. We will proceed with the planned procedure.br

## 2022-04-06 DIAGNOSIS — I10 HYPERTENSION, ESSENTIAL: Chronic | ICD-10-CM

## 2022-04-06 DIAGNOSIS — E78.5 HYPERLIPIDEMIA, UNSPECIFIED HYPERLIPIDEMIA TYPE: ICD-10-CM

## 2022-09-12 ENCOUNTER — OFFICE VISIT (OUTPATIENT)
Dept: CARDIOLOGY | Facility: CLINIC | Age: 67
End: 2022-09-12

## 2022-09-12 VITALS
SYSTOLIC BLOOD PRESSURE: 142 MMHG | WEIGHT: 194 LBS | OXYGEN SATURATION: 97 % | HEIGHT: 72 IN | HEART RATE: 70 BPM | BODY MASS INDEX: 26.28 KG/M2 | DIASTOLIC BLOOD PRESSURE: 70 MMHG

## 2022-09-12 DIAGNOSIS — I10 HYPERTENSION, ESSENTIAL: Primary | Chronic | ICD-10-CM

## 2022-09-12 DIAGNOSIS — E78.2 MIXED HYPERLIPIDEMIA: ICD-10-CM

## 2022-09-12 PROCEDURE — 99214 OFFICE O/P EST MOD 30 MIN: CPT | Performed by: INTERNAL MEDICINE

## 2022-09-12 RX ORDER — CARVEDILOL 12.5 MG/1
12.5 TABLET ORAL 2 TIMES DAILY WITH MEALS
Qty: 180 TABLET | Refills: 3 | Status: SHIPPED | OUTPATIENT
Start: 2022-09-12

## 2022-09-12 RX ORDER — FUROSEMIDE 20 MG/1
20 TABLET ORAL DAILY
COMMUNITY

## 2022-09-12 RX ORDER — CARVEDILOL 25 MG/1
25 TABLET ORAL DAILY
COMMUNITY
End: 2022-09-12 | Stop reason: SDUPTHER

## 2022-09-12 RX ORDER — HYDRALAZINE HYDROCHLORIDE 25 MG/1
25 TABLET, FILM COATED ORAL DAILY
Qty: 90 TABLET | Refills: 3 | Status: CANCELLED | OUTPATIENT
Start: 2022-09-12

## 2022-09-12 RX ORDER — HYDRALAZINE HYDROCHLORIDE 25 MG/1
25 TABLET, FILM COATED ORAL DAILY
COMMUNITY
End: 2022-09-12 | Stop reason: ALTCHOICE

## 2022-09-12 RX ORDER — LOSARTAN POTASSIUM 25 MG/1
25 TABLET ORAL DAILY
Qty: 90 TABLET | Refills: 3 | Status: SHIPPED | OUTPATIENT
Start: 2022-09-12

## 2022-09-12 RX ORDER — ATORVASTATIN CALCIUM 40 MG/1
40 TABLET, FILM COATED ORAL DAILY
Qty: 90 TABLET | Refills: 3 | Status: SHIPPED | OUTPATIENT
Start: 2022-09-12

## 2022-09-12 NOTE — PROGRESS NOTES
Office Visit    Chief Complaint  Hypertension and Hyperlipidemia    Subjective            Holly Nogueira presents to [facility] CARDIOLOGY  Mr. Nogueira is a 67 years old gentleman with hypertension hyperlipidemia made a lot of changes in his medications on on his own.  His blood pressure was running low and he was getting dizzy so he cut out the losartan, made the hydralazine once a day and cut out the morning dose of his carvedilol completely.  I have suggested to him that he should not make changes on his own.  He denies chest pain palpitations dizziness syncope.  He does not have a PCP and is looking for one.      Past Medical History:   Diagnosis Date   • Alcohol abuse 1998   • Bilateral lower extremity edema    • Broken bones    • Erectile dysfunction 06/18/2019   • Hypertension    • Hypertension, essential 8/26/2021   • Mixed hyperlipidemia    • Peyronie's disease 06/18/2019   • Prostate cancer (HCC)    • Renal cyst, left    • Sinus trouble    • Vitamin D deficiency        No Known Allergies     Past Surgical History:   Procedure Laterality Date   • ELBOW ARTHROSCOPY Left         Social History     Tobacco Use   • Smoking status: Former Smoker     Packs/day: 2.00     Types: Cigarettes   • Smokeless tobacco: Never Used   • Tobacco comment: Smoked 30 years   Vaping Use   • Vaping Use: Never used   Substance Use Topics   • Alcohol use: Not Currently     Comment: Former   • Drug use: Never       Family History   Problem Relation Age of Onset   • Stroke Mother    • Heart disease Mother    • Cirrhosis Brother         Cirrosis of liver   • Alcohol abuse Other         Alcoholism in family        Prior to Admission medications    Medication Sig Start Date End Date Taking? Authorizing Provider   Acetaminophen (TYLENOL ARTHRITIS PAIN PO) Take  by mouth.   Yes Provider, MD Shama   atorvastatin (LIPITOR) 20 MG tablet Take 20 mg by mouth Daily.   Yes Provider, MD Shama   carvedilol (COREG) 25 MG  "tablet Take 25 mg by mouth Daily.   Yes Provider, MD Shama   furosemide (LASIX) 20 MG tablet Take 20 mg by mouth Daily.   Yes ProviderShama MD   hydrALAZINE (APRESOLINE) 25 MG tablet Take 25 mg by mouth Daily.   Yes Provider, MD Shama   NIFEdipine XL (PROCARDIA XL) 60 MG 24 hr tablet Take 60 mg by mouth Daily.   Yes ProviderShama MD   pentoxifylline (TRENtal) 400 MG CR tablet  3/5/22  Yes ProviderShama MD   tadalafil (CIALIS) 5 MG tablet Take 5 mg by mouth As Needed.   Yes Provider, MD Shama   VITAMIN D PO Take  by mouth.   Yes Provider, MD Shama   carvedilol (COREG) 25 MG tablet Take 1 tablet by mouth 2 (Two) Times a Day With Meals.  Patient taking differently: Take 25 mg by mouth Daily. 3/7/22   Diallo Novak MD   hydrALAZINE (APRESOLINE) 25 MG tablet Take 1 tablet by mouth 2 (Two) Times a Day. 8/30/21   Elza Nash ZakiaTATIANA   losartan (COZAAR) 100 MG tablet Take 1 tablet by mouth Daily. 8/30/21   Elza Nash June, APRN   spironolactone 25 MG tablet 25 mg, hydroCHLOROthiazide 25 MG tablet 25 mg Take 1 tablet by mouth Daily. 3/7/22   Diallo Novak MD        Review of Systems   Constitutional: Negative for fatigue.   Respiratory: Negative for cough and shortness of breath.    Cardiovascular: Negative for chest pain, palpitations and leg swelling.   Neurological: Negative for dizziness.        Objective     /70   Pulse 70   Ht 182.9 cm (72\")   Wt 88 kg (194 lb)   SpO2 97%   BMI 26.31 kg/m²       Physical Exam  Constitutional:       General: He is awake.      Appearance: Normal appearance.   Neck:      Thyroid: No thyromegaly.      Vascular: No carotid bruit or JVD.   Cardiovascular:      Rate and Rhythm: Normal rate and regular rhythm.      Chest Wall: PMI is not displaced.      Pulses: Normal pulses.      Heart sounds: Normal heart sounds, S1 normal and S2 normal. No murmur heard.    No friction rub. No gallop. No S3 or S4 sounds.   Pulmonary:      " Effort: Pulmonary effort is normal.      Breath sounds: Normal breath sounds and air entry. No wheezing, rhonchi or rales.   Abdominal:      General: Bowel sounds are normal.      Palpations: Abdomen is soft. There is no mass.      Tenderness: There is no abdominal tenderness.   Musculoskeletal:      Cervical back: Neck supple.      Right lower leg: No edema.      Left lower leg: No edema.   Neurological:      Mental Status: He is alert and oriented to person, place, and time.   Psychiatric:         Mood and Affect: Mood normal.         Behavior: Behavior is cooperative.       No results found for: PROBNP, BNP          Lab Results   Component Value Date    TSH 1.490 06/17/2019      No results found for: FREET4   No results found for: DDIMERQUANT  No results found for: MG   No results found for: DIGOXIN   Outside labs were reviewed.  Chemistry panel TSH is normal.  Lipids are not at goal with LDL of 107          Result Review :                           Assessment and Plan        Diagnoses and all orders for this visit:    1. Hypertension, essential (Primary)  Assessment & Plan:  Mr. Nogueira is a 67 years old gentleman with hypertension hyperlipidemia made a lot of changes in his medications on on his own.  His blood pressure was running low and he was getting dizzy so he cut out the losartan, made his hydralazine once a day and cut out the morning dose of his carvedilol completely.  I have suggested to him that he should not make changes on his own.  I am going to have him take 25 mg of losartan in the morning carvedilol 12.5 twice daily, take hydralazine only on a as needed basis for systolic of 140 or greater and start taking his furosemide in the morning instead of at bedtime.  He will continue the nifedipine and the current dosage and he will start a 2-week blood pressure log starting Monday next week and bring it to us    Orders:  -     Lipid Panel; Future  -     Comprehensive Metabolic Panel; Future  -      Magnesium; Future  -     Lipid Panel; Future  -     Comprehensive Metabolic Panel; Future  -     Magnesium; Future    2. Mixed hyperlipidemia  Assessment & Plan:  His lipids are not quite at goal.  He eats red meat 3 to 4 days a week and have suggested to him to cut down to once a week.  In addition he will increase his atorvastatin to 40 mg at bedtime    Orders:  -     Lipid Panel; Future  -     Comprehensive Metabolic Panel; Future  -     Magnesium; Future  -     Lipid Panel; Future  -     Comprehensive Metabolic Panel; Future  -     Magnesium; Future    Other orders  -     carvedilol (COREG) 12.5 MG tablet; Take 1 tablet by mouth 2 (Two) Times a Day With Meals.  Dispense: 180 tablet; Refill: 3  -     losartan (Cozaar) 25 MG tablet; Take 1 tablet by mouth Daily.  Dispense: 90 tablet; Refill: 3  -     atorvastatin (LIPITOR) 40 MG tablet; Take 1 tablet by mouth Daily.  Dispense: 90 tablet; Refill: 3          Follow Up     Return in about 7 months (around 4/12/2023) for Lora/Kyra for fu.    Patient was given instructions and counseling regarding his condition or for health maintenance advice. Please see specific information pulled into the AVS if appropriate.     Diallo Novak MD  09/12/22 09:36 EDT

## 2022-09-12 NOTE — ASSESSMENT & PLAN NOTE
Mr. Nogueira is a 67 years old gentleman with hypertension hyperlipidemia made a lot of changes in his medications on on his own.  His blood pressure was running low and he was getting dizzy so he cut out the losartan, made his hydralazine once a day and cut out the morning dose of his carvedilol completely.  I have suggested to him that he should not make changes on his own.  I am going to have him take 25 mg of losartan in the morning carvedilol 12.5 twice daily, take hydralazine only on a as needed basis for systolic of 140 or greater and start taking his furosemide in the morning instead of at bedtime.  He will continue the nifedipine and the current dosage and he will start a 2-week blood pressure log starting Monday next week and bring it to us

## 2022-09-12 NOTE — ASSESSMENT & PLAN NOTE
His lipids are not quite at goal.  He eats red meat 3 to 4 days a week and have suggested to him to cut down to once a week.  In addition he will increase his atorvastatin to 40 mg at bedtime

## 2022-09-22 VITALS
SYSTOLIC BLOOD PRESSURE: 107 MMHG | DIASTOLIC BLOOD PRESSURE: 49 MMHG | SYSTOLIC BLOOD PRESSURE: 130 MMHG | SYSTOLIC BLOOD PRESSURE: 87 MMHG | DIASTOLIC BLOOD PRESSURE: 53 MMHG | OXYGEN SATURATION: 97 % | SYSTOLIC BLOOD PRESSURE: 90 MMHG | DIASTOLIC BLOOD PRESSURE: 48 MMHG | SYSTOLIC BLOOD PRESSURE: 88 MMHG | OXYGEN SATURATION: 96 % | SYSTOLIC BLOOD PRESSURE: 134 MMHG | OXYGEN SATURATION: 99 % | RESPIRATION RATE: 17 BRPM | HEART RATE: 63 BPM | HEART RATE: 66 BPM | DIASTOLIC BLOOD PRESSURE: 55 MMHG | WEIGHT: 195 LBS | RESPIRATION RATE: 15 BRPM | SYSTOLIC BLOOD PRESSURE: 91 MMHG | SYSTOLIC BLOOD PRESSURE: 89 MMHG | DIASTOLIC BLOOD PRESSURE: 54 MMHG | SYSTOLIC BLOOD PRESSURE: 112 MMHG | DIASTOLIC BLOOD PRESSURE: 46 MMHG | HEART RATE: 69 BPM | TEMPERATURE: 97.2 F | DIASTOLIC BLOOD PRESSURE: 58 MMHG | HEART RATE: 67 BPM | HEART RATE: 61 BPM | RESPIRATION RATE: 16 BRPM | RESPIRATION RATE: 7 BRPM | SYSTOLIC BLOOD PRESSURE: 105 MMHG | HEART RATE: 60 BPM | HEART RATE: 64 BPM | HEIGHT: 72 IN | RESPIRATION RATE: 18 BRPM | OXYGEN SATURATION: 98 % | HEART RATE: 62 BPM | TEMPERATURE: 97 F | DIASTOLIC BLOOD PRESSURE: 45 MMHG | RESPIRATION RATE: 14 BRPM | DIASTOLIC BLOOD PRESSURE: 94 MMHG | RESPIRATION RATE: 20 BRPM | SYSTOLIC BLOOD PRESSURE: 98 MMHG | RESPIRATION RATE: 13 BRPM | OXYGEN SATURATION: 100 %

## 2022-09-26 ENCOUNTER — AMBULATORY SURGICAL CENTER (AMBULATORY)
Dept: URBAN - METROPOLITAN AREA SURGERY 17 | Facility: SURGERY | Age: 67
End: 2022-09-26

## 2022-09-26 ENCOUNTER — OFFICE (AMBULATORY)
Dept: URBAN - METROPOLITAN AREA PATHOLOGY 4 | Facility: PATHOLOGY | Age: 67
End: 2022-09-26

## 2022-09-26 DIAGNOSIS — Z86.010 PERSONAL HISTORY OF COLONIC POLYPS: ICD-10-CM

## 2022-09-26 DIAGNOSIS — D12.2 BENIGN NEOPLASM OF ASCENDING COLON: ICD-10-CM

## 2022-09-26 DIAGNOSIS — K64.8 OTHER HEMORRHOIDS: ICD-10-CM

## 2022-09-26 DIAGNOSIS — D12.5 BENIGN NEOPLASM OF SIGMOID COLON: ICD-10-CM

## 2022-09-26 DIAGNOSIS — K62.1 RECTAL POLYP: ICD-10-CM

## 2022-09-26 DIAGNOSIS — D17.79 BENIGN LIPOMATOUS NEOPLASM OF OTHER SITES: ICD-10-CM

## 2022-09-26 DIAGNOSIS — K57.30 DIVERTICULOSIS OF LARGE INTESTINE WITHOUT PERFORATION OR ABS: ICD-10-CM

## 2022-09-26 DIAGNOSIS — K63.89 OTHER SPECIFIED DISEASES OF INTESTINE: ICD-10-CM

## 2022-09-26 PROCEDURE — 88305 TISSUE EXAM BY PATHOLOGIST: CPT | Performed by: INTERNAL MEDICINE

## 2022-09-26 PROCEDURE — 45385 COLONOSCOPY W/LESION REMOVAL: CPT | Mod: PT | Performed by: INTERNAL MEDICINE

## 2022-09-26 PROCEDURE — 45380 COLONOSCOPY AND BIOPSY: CPT | Mod: 59,PT | Performed by: INTERNAL MEDICINE

## 2022-09-26 PROCEDURE — 45380 COLONOSCOPY AND BIOPSY: CPT | Mod: PT,59 | Performed by: INTERNAL MEDICINE

## 2022-09-26 NOTE — SERVICEHPINOTES
DOMINICK ROCHA  is a  67  male   who presents today for a  Colonoscopy   for   the indications listed below. The updated Patient Profile was reviewed prior to the procedure, in conjunction with the Physical Exam, including medical conditions, surgical procedures, medications, allergies, family history and social history. See Physical Exam time stamp below for date and time of HPI completion.Pre-operatively, I reviewed the indication(s) for the procedure, the risks of the procedure [including but not limited to: unexpected bleeding possibly requiring hospitalization and/or unplanned repeat procedures, perforation possibly requiring surgical treatment, missed lesions and complications of sedation/MAC (also explained by anesthesia staff)]. I have evaluated the patient for risks associated with the planned anesthesia and the procedure to be performed and find the patient an acceptable candidate for IV sedation.Multiple opportunities were provided for any questions or concerns, and all questions were answered satisfactorily before any anesthesia was administered. We will proceed with the planned procedure.br

## 2022-10-05 ENCOUNTER — TELEPHONE (OUTPATIENT)
Dept: CARDIOLOGY | Facility: CLINIC | Age: 67
End: 2022-10-05

## 2022-10-05 NOTE — TELEPHONE ENCOUNTER
Notify pt morning blood pressures are well controlled and evening blood pressures tend to run higher. Change losartan 25 mg daily to 25 mg twice daily. Continue to monitor

## 2022-11-18 PROBLEM — K63.89 OTHER SPECIFIED DISEASES OF INTESTINE: Status: ACTIVE | Noted: 2022-09-26

## 2022-11-18 PROBLEM — K57.30 DVRTCLOS OF LG INT W/O PERFORATION OR ABSCESS W/O BLEEDING: Status: ACTIVE | Noted: 2019-04-11

## 2022-11-18 PROBLEM — D17.79 BENIGN LIPOMATOUS NEOPLASM OF OTHER SITES: Status: ACTIVE | Noted: 2022-09-26

## 2022-11-18 PROBLEM — K63.5 POLYP OF COLON: Status: ACTIVE | Noted: 2022-03-15

## 2023-04-12 ENCOUNTER — TELEPHONE (OUTPATIENT)
Dept: CARDIOLOGY | Facility: CLINIC | Age: 68
End: 2023-04-12
Payer: MEDICARE

## 2023-04-12 NOTE — TELEPHONE ENCOUNTER
Caller: Holly Nogueira    Relationship: Self    Best call back number: [phone]    What orders are you requesting (i.e. lab or imaging): LABS     In what timeframe would the patient need to come in: NEXT DAY OR 2     Where will you receive your lab/imaging services: [facility]     Additional notes: PT WOULD LIKE ORDER SENT TO [facility] FOR HIS UPCOMING APPT.

## 2023-04-19 ENCOUNTER — OFFICE VISIT (OUTPATIENT)
Dept: CARDIOLOGY | Facility: CLINIC | Age: 68
End: 2023-04-19
Payer: MEDICARE

## 2023-04-19 VITALS
BODY MASS INDEX: 25.87 KG/M2 | HEART RATE: 63 BPM | WEIGHT: 191 LBS | DIASTOLIC BLOOD PRESSURE: 61 MMHG | SYSTOLIC BLOOD PRESSURE: 131 MMHG | HEIGHT: 72 IN

## 2023-04-19 DIAGNOSIS — E78.2 MIXED HYPERLIPIDEMIA: ICD-10-CM

## 2023-04-19 DIAGNOSIS — I10 HYPERTENSION, ESSENTIAL: Primary | Chronic | ICD-10-CM

## 2023-04-19 PROCEDURE — 3075F SYST BP GE 130 - 139MM HG: CPT | Performed by: INTERNAL MEDICINE

## 2023-04-19 PROCEDURE — 3078F DIAST BP <80 MM HG: CPT | Performed by: INTERNAL MEDICINE

## 2023-04-19 PROCEDURE — 99214 OFFICE O/P EST MOD 30 MIN: CPT | Performed by: INTERNAL MEDICINE

## 2023-04-19 NOTE — PROGRESS NOTES
Chief Complaint  Follow-up, Hypertension, and Hyperlipidemia    Subjective    Patient with no complaints or problems since last visit.  Denies any anginal chest pain or change in breathing capacity  Past Medical History:   Diagnosis Date   • Alcohol abuse 1998   • Bilateral lower extremity edema    • Broken bones    • Erectile dysfunction 06/18/2019   • Hypertension    • Hypertension, essential 8/26/2021   • Mixed hyperlipidemia    • Peyronie's disease 06/18/2019   • Prostate cancer    • Renal cyst, left    • Sinus trouble    • Vitamin D deficiency          Current Outpatient Medications:   •  Acetaminophen (TYLENOL ARTHRITIS PAIN PO), Take  by mouth., Disp: , Rfl:   •  atorvastatin (LIPITOR) 40 MG tablet, Take 1 tablet by mouth Daily., Disp: 90 tablet, Rfl: 3  •  carvedilol (COREG) 12.5 MG tablet, Take 1 tablet by mouth 2 (Two) Times a Day With Meals., Disp: 180 tablet, Rfl: 3  •  furosemide (LASIX) 20 MG tablet, Take 1 tablet by mouth Daily. Pt has been taking 40 mg daily, Disp: , Rfl:   •  losartan (Cozaar) 25 MG tablet, Take 1 tablet by mouth Daily., Disp: 90 tablet, Rfl: 3  •  NIFEdipine XL (PROCARDIA XL) 60 MG 24 hr tablet, Take 1 tablet by mouth Daily., Disp: , Rfl:   •  pentoxifylline (TRENtal) 400 MG CR tablet, , Disp: , Rfl:   •  VITAMIN D PO, Take  by mouth., Disp: , Rfl:     Medications Discontinued During This Encounter   Medication Reason   • tadalafil (CIALIS) 5 MG tablet *Therapy completed     No Known Allergies     Social History     Tobacco Use   • Smoking status: Former     Packs/day: 2.00     Types: Cigarettes   • Smokeless tobacco: Never   • Tobacco comments:     Smoked 30 years   Vaping Use   • Vaping Use: Never used   Substance Use Topics   • Alcohol use: Not Currently     Comment: Former   • Drug use: Never       Family History   Problem Relation Age of Onset   • Stroke Mother    • Heart disease Mother    • Cirrhosis Brother         Cirrosis of liver   • Alcohol abuse Other         Alcoholism  "in family        Objective     /61   Pulse 63   Ht 182.9 cm (72\")   Wt 86.6 kg (191 lb)   BMI 25.90 kg/m²       Physical Exam    General Appearance:   · no acute distress  · Alert and oriented x3  HENT:   · lips not cyanotic  · Atraumatic  Neck:  · No jvd   · supple  Respiratory:  · no respiratory distress  · normal breath sounds  · no rales  Cardiovascular:  · Regular rate and rhythm  · no S3, no S4   · no murmur  · no rub  Extremities  · No cyanosis  · lower extremity edema: none    Skin:   · warm, dry  · No rashes      Result Review :     No results found for: PROBNP       Lab Results   Component Value Date    TSH 1.490 06/17/2019      No results found for: FREET4   No results found for: DDIMERQUANT  No results found for: MG   No results found for: DIGOXIN   No results found for: TROPONINT           Component   Ref Range & Units 5 d ago Comments   CHOLESTEROL 2-TL   <200 mg/dL 128    NATIONAL CHOLESTEROL GUIDELINES   [facility] ([facility]) GUIDELINES FOR CLASSIFICATION, TESTING AND MANAGEMENT OF CHOLESTEROL LEVELS IN ADULTS OVER 20 YEARS OF AGE. THIS NEW CLASSIFICATION CREATES THREE CATEGORIES OF RISK FOR CORONARY HEART DISEASE, REGARDLESS   OF AGE OR SEX, ACCORDING TO TOTAL LDL CHOLESTEROLS LEVELS.     BASED ON TOTAL CHOLESTEROL LEVEL   DESIRABLE      <200 MG/DL   BORDERLINE-HIGH 200-239 MG/DL   HIGH            >=240 MG/DL   TRIGLYCERIDES 2-TL   30 - 200 mg/dL 64  DUE TO REVISED SPECIFICITY OF A TRIGLYCERIDE RESULT AT 600MG/DL OR GREATER MAY CAUSE A POSITIVE BIAS OF APPROXIMATELY 2.1 MMOL/L TO CHLORIDE WHICH CAN RESULT IN AN ERRONEOUSLY LOW ANION GAP.   HDL-TL   >60 mg/dL 41 Low     <40 MG/DL= MAJOR RISK FACTOR FOR CHD   60 MG/DL OR GREATER= NEG RISK FACTOR FOR CHD   LDL   0 - 99 mg/dL 75     RISK FACTOR-TL  3.1         No results found for: POCTROP  Component   Ref Range & Units 5 d ago Comments   GLUCOSE-TL   70 - 110 mg/dL 102     BUN-TL   7 - 18 mg/dL 10     CREATININE-TL "   0.8 - 1.3 mg/dL 0.6 Low      SODIUM-TL   136 - 145 mmol/L 140     POTASSIUM-TL   3.5 - 5.1 mmol/L 4.5     Chloride   98 - 107 mmol/L 104     CARBON DIOXIDE-TL   21 - 32 mmol/L 29     CALCIUM-TL   8.7 - 10.2 mg/dL 9.4     TOTAL PROTEIN-TL   6.4 - 8.2 g/dL 7.0     ALBUMIN-TL   3.4 - 5.0 g/dL 4.3     TOTAL BILIRUBIN-TL   0.2 - 1.3 mg/dL 0.56     GOT (AST)-TL   15 - 37 U/L 32     GPT (ALT)-TL   0 - 50 U/L 44     ALP-TL   50 - 136 U/L 102     GFR ESTIMATE-TL   mL/min 104                                    Diagnoses and all orders for this visit:    1. Hypertension, essential (Primary)  Assessment & Plan:  Blood pressure control continue on Coreg 12.5 mg twice daily, Procardia 60 mg daily, and losartan 25 daily dosing and encourage low-sodium diet      2. Mixed hyperlipidemia  Assessment & Plan:  Continue on Lipitor 40 nightly no myalgias LDL goal    Orders:  -     Hepatic Function Panel; Future  -     Lipid Panel; Future          Follow Up     Return in about 1 year (around 4/19/2024) for Follow with Lora Bernardo.          Patient was given instructions and counseling regarding his condition or for health maintenance advice. Please see specific information pulled into the AVS if appropriate.

## 2023-04-19 NOTE — ASSESSMENT & PLAN NOTE
Blood pressure control continue on Coreg 12.5 mg twice daily, Procardia 60 mg daily, and losartan 25 daily dosing and encourage low-sodium diet

## 2023-04-24 ENCOUNTER — ON CAMPUS - OUTPATIENT (AMBULATORY)
Dept: URBAN - METROPOLITAN AREA HOSPITAL 108 | Facility: HOSPITAL | Age: 68
End: 2023-04-24

## 2023-04-24 DIAGNOSIS — Z86.010 PERSONAL HISTORY OF COLONIC POLYPS: ICD-10-CM

## 2023-04-24 DIAGNOSIS — D12.2 BENIGN NEOPLASM OF ASCENDING COLON: ICD-10-CM

## 2023-04-24 DIAGNOSIS — K64.0 FIRST DEGREE HEMORRHOIDS: ICD-10-CM

## 2023-04-24 DIAGNOSIS — K57.30 DIVERTICULOSIS OF LARGE INTESTINE WITHOUT PERFORATION OR ABS: ICD-10-CM

## 2023-04-24 DIAGNOSIS — K63.89 OTHER SPECIFIED DISEASES OF INTESTINE: ICD-10-CM

## 2023-04-24 PROCEDURE — 45388 COLONOSCOPY W/ABLATION: CPT | Mod: PT | Performed by: INTERNAL MEDICINE

## 2023-04-24 PROCEDURE — 45385 COLONOSCOPY W/LESION REMOVAL: CPT | Mod: 59,PT | Performed by: INTERNAL MEDICINE

## 2023-04-27 ENCOUNTER — ON CAMPUS - OUTPATIENT (AMBULATORY)
Dept: URBAN - METROPOLITAN AREA HOSPITAL 108 | Facility: HOSPITAL | Age: 68
End: 2023-04-27

## 2023-04-27 DIAGNOSIS — Z86.010 PERSONAL HISTORY OF COLONIC POLYPS: ICD-10-CM

## 2023-04-27 DIAGNOSIS — K57.30 DIVERTICULOSIS OF LARGE INTESTINE WITHOUT PERFORATION OR ABS: ICD-10-CM

## 2023-04-27 DIAGNOSIS — D17.5 BENIGN LIPOMATOUS NEOPLASM OF INTRA-ABDOMINAL ORGANS: ICD-10-CM

## 2023-04-27 DIAGNOSIS — D12.2 BENIGN NEOPLASM OF ASCENDING COLON: ICD-10-CM

## 2023-04-27 DIAGNOSIS — K64.0 FIRST DEGREE HEMORRHOIDS: ICD-10-CM

## 2023-04-27 DIAGNOSIS — K62.1 RECTAL POLYP: ICD-10-CM

## 2023-04-27 PROCEDURE — 45380 COLONOSCOPY AND BIOPSY: CPT | Mod: 59,PT | Performed by: INTERNAL MEDICINE

## 2023-04-27 PROCEDURE — 45385 COLONOSCOPY W/LESION REMOVAL: CPT | Mod: PT | Performed by: INTERNAL MEDICINE

## 2023-07-31 ENCOUNTER — TELEPHONE (OUTPATIENT)
Dept: UROLOGY | Facility: CLINIC | Age: 68
End: 2023-07-31
Payer: MEDICARE

## 2023-08-21 RX ORDER — ATORVASTATIN CALCIUM 40 MG/1
TABLET, FILM COATED ORAL
Qty: 90 TABLET | Refills: 3 | Status: SHIPPED | OUTPATIENT
Start: 2023-08-21

## 2024-04-19 ENCOUNTER — OFFICE VISIT (OUTPATIENT)
Dept: CARDIOLOGY | Facility: CLINIC | Age: 69
End: 2024-04-19
Payer: MEDICARE

## 2024-04-19 VITALS
DIASTOLIC BLOOD PRESSURE: 53 MMHG | HEART RATE: 60 BPM | BODY MASS INDEX: 25.87 KG/M2 | SYSTOLIC BLOOD PRESSURE: 105 MMHG | HEIGHT: 72 IN | WEIGHT: 191 LBS

## 2024-04-19 DIAGNOSIS — E78.2 MIXED HYPERLIPIDEMIA: ICD-10-CM

## 2024-04-19 DIAGNOSIS — I10 HYPERTENSION, ESSENTIAL: Primary | Chronic | ICD-10-CM

## 2024-04-19 PROBLEM — N52.9 IMPOTENCE OF ORGANIC ORIGIN: Status: ACTIVE | Noted: 2019-06-18

## 2024-04-19 PROBLEM — R91.8 MULTIPLE LUNG NODULES: Status: ACTIVE | Noted: 2023-02-08

## 2024-04-19 PROBLEM — F10.10 ALCOHOL ABUSE: Status: ACTIVE | Noted: 2024-04-19

## 2024-04-19 PROBLEM — N48.6 PEYRONIE'S DISEASE: Status: ACTIVE | Noted: 2019-06-18

## 2024-04-19 PROBLEM — C61 PROSTATE CANCER: Status: ACTIVE | Noted: 2024-04-19

## 2024-04-19 PROBLEM — I73.9 PAD (PERIPHERAL ARTERY DISEASE): Chronic | Status: ACTIVE | Noted: 2022-12-15

## 2024-04-19 PROBLEM — N28.1 SIMPLE RENAL CYST: Status: ACTIVE | Noted: 2024-04-19

## 2024-04-19 PROBLEM — E55.9 VITAMIN D DEFICIENCY: Chronic | Status: ACTIVE | Noted: 2022-12-15

## 2024-04-19 PROCEDURE — 3074F SYST BP LT 130 MM HG: CPT | Performed by: NURSE PRACTITIONER

## 2024-04-19 PROCEDURE — 3078F DIAST BP <80 MM HG: CPT | Performed by: NURSE PRACTITIONER

## 2024-04-19 PROCEDURE — 1160F RVW MEDS BY RX/DR IN RCRD: CPT | Performed by: NURSE PRACTITIONER

## 2024-04-19 PROCEDURE — 99213 OFFICE O/P EST LOW 20 MIN: CPT | Performed by: NURSE PRACTITIONER

## 2024-04-19 PROCEDURE — 1159F MED LIST DOCD IN RCRD: CPT | Performed by: NURSE PRACTITIONER

## 2024-04-19 RX ORDER — CARVEDILOL 12.5 MG/1
12.5 TABLET ORAL 2 TIMES DAILY WITH MEALS
Qty: 180 TABLET | Refills: 3 | Status: SHIPPED | OUTPATIENT
Start: 2024-04-19

## 2024-04-19 RX ORDER — CETIRIZINE HYDROCHLORIDE 10 MG/1
10 TABLET ORAL DAILY
COMMUNITY

## 2024-04-19 RX ORDER — LOSARTAN POTASSIUM 25 MG/1
25 TABLET ORAL DAILY
Qty: 90 TABLET | Refills: 3 | Status: SHIPPED | OUTPATIENT
Start: 2024-04-19

## 2024-04-19 NOTE — PROGRESS NOTES
Chief Complaint  Follow-up    Subjective            History of Present Illness  Holly Nogueira is a 68-year-old male patient who presents to the office today for follow-up.  He has hypertension and hyperlipidemia.  He is compliant with medication.  He denies any chest pain, shortness of breath, lightheadedness/dizziness, palpitations, or edema.    PMH  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/18/2019    Prostate cancer     Renal cyst, left     Sinus trouble     Vitamin D deficiency          ALLERGY  No Known Allergies       SURGICALHX  Past Surgical History:   Procedure Laterality Date    ELBOW ARTHROSCOPY Left           SOC  Social History     Socioeconomic History    Marital status:    Tobacco Use    Smoking status: Former     Current packs/day: 2.00     Types: Cigarettes    Smokeless tobacco: Never    Tobacco comments:     Smoked 30 years   Vaping Use    Vaping status: Never Used   Substance and Sexual Activity    Alcohol use: Not Currently     Comment: Former    Drug use: Never    Sexual activity: Defer         FAMHX  Family History   Problem Relation Age of Onset    Stroke Mother     Heart disease Mother     Cirrhosis Brother         Cirrosis of liver    Alcohol abuse Other         Alcoholism in family          MEDSIGONLY  Current Outpatient Medications on File Prior to Visit   Medication Sig    Acetaminophen (TYLENOL ARTHRITIS PAIN PO) Take  by mouth.    ascorbic acid (VITAMIN C) 250 MG tablet Take 1 tablet by mouth Daily.    atorvastatin (LIPITOR) 40 MG tablet TAKE 1 TABLET DAILY    carvedilol (COREG) 12.5 MG tablet Take 1 tablet by mouth 2 (Two) Times a Day With Meals.    cetirizine (zyrTEC) 10 MG tablet Take 1 tablet by mouth Daily.    furosemide (LASIX) 20 MG tablet Take 1 tablet by mouth  "Daily. Pt has been taking 40 mg daily    losartan (Cozaar) 25 MG tablet Take 1 tablet by mouth Daily.    NIFEdipine XL (PROCARDIA XL) 60 MG 24 hr tablet Take 1 tablet by mouth Daily.    pentoxifylline (TRENtal) 400 MG CR tablet     VITAMIN D PO Take  by mouth.     No current facility-administered medications on file prior to visit.         Objective   /53   Pulse 60   Ht 182.9 cm (72\")   Wt 86.6 kg (191 lb)   BMI 25.90 kg/m²       Physical Exam  HENT:      Head: Normocephalic.   Neck:      Vascular: No carotid bruit.   Cardiovascular:      Rate and Rhythm: Normal rate and regular rhythm.      Pulses: Normal pulses.      Heart sounds: Normal heart sounds. No murmur heard.  Pulmonary:      Effort: Pulmonary effort is normal.      Breath sounds: Normal breath sounds.   Musculoskeletal:      Cervical back: Neck supple.      Right lower leg: No edema.      Left lower leg: No edema.   Skin:     General: Skin is dry.   Neurological:      Mental Status: He is alert and oriented to person, place, and time.   Psychiatric:         Behavior: Behavior normal.       Result Review :   The following data was reviewed by: TATIANA Starr on 04/19/2024:  No results found for: \"PROBNP\"       Lab Results   Component Value Date    TSH 1.490 06/17/2019      No results found for: \"FREET4\"   No results found for: \"DDIMERQUANT\"  No results found for: \"MG\"   No results found for: \"DIGOXIN\"   No results found for: \"TROPONINT\"             Assessment and Plan    Diagnoses and all orders for this visit:    1. Hypertension, essential (Primary)  Currently controlled and without adverse effects from medication, continue carvedilol 12.5 mg twice daily, losartan 25 mg daily, and nifedipine 60 mg daily.    2. Mixed hyperlipidemia  Last lipid panel was 4/14/2023 with LDL 75 which is within goal range, continue atorvastatin 40 mg nightly.  Repeat fasting lipid and hepatic function panel prior to next office visit.      Other orders  -    "  carvedilol (COREG) 12.5 MG tablet; Take 1 tablet by mouth 2 (Two) Times a Day With Meals.  Dispense: 180 tablet; Refill: 3  -     losartan (Cozaar) 25 MG tablet; Take 1 tablet by mouth Daily.  Dispense: 90 tablet; Refill: 3            Follow Up   Return in about 1 year (around 4/19/2025) for Follow up with Dr Rae.    Patient was given instructions and counseling regarding his condition or for health maintenance advice. Please see specific information pulled into the AVS if appropriate.     Holly Nogueira  reports that he has quit smoking. His smoking use included cigarettes. He has never used smokeless tobacco.            Lora Bernardo, TATIANA  04/19/24  10:22 EDT    Dictated Utilizing Dragon Dictation

## 2024-06-11 VITALS
HEART RATE: 70 BPM | DIASTOLIC BLOOD PRESSURE: 76 MMHG | OXYGEN SATURATION: 96 % | HEART RATE: 66 BPM | DIASTOLIC BLOOD PRESSURE: 70 MMHG | RESPIRATION RATE: 13 BRPM | OXYGEN SATURATION: 98 % | RESPIRATION RATE: 15 BRPM | WEIGHT: 185 LBS | HEART RATE: 62 BPM | SYSTOLIC BLOOD PRESSURE: 141 MMHG | DIASTOLIC BLOOD PRESSURE: 66 MMHG | HEART RATE: 75 BPM | SYSTOLIC BLOOD PRESSURE: 127 MMHG | SYSTOLIC BLOOD PRESSURE: 190 MMHG | DIASTOLIC BLOOD PRESSURE: 47 MMHG | OXYGEN SATURATION: 97 % | SYSTOLIC BLOOD PRESSURE: 125 MMHG | SYSTOLIC BLOOD PRESSURE: 146 MMHG | DIASTOLIC BLOOD PRESSURE: 36 MMHG | TEMPERATURE: 96.7 F | SYSTOLIC BLOOD PRESSURE: 172 MMHG | SYSTOLIC BLOOD PRESSURE: 113 MMHG | RESPIRATION RATE: 10 BRPM | DIASTOLIC BLOOD PRESSURE: 50 MMHG | RESPIRATION RATE: 7 BRPM | TEMPERATURE: 96.9 F | DIASTOLIC BLOOD PRESSURE: 45 MMHG | RESPIRATION RATE: 12 BRPM | HEART RATE: 71 BPM | RESPIRATION RATE: 16 BRPM | HEART RATE: 76 BPM | DIASTOLIC BLOOD PRESSURE: 58 MMHG | SYSTOLIC BLOOD PRESSURE: 134 MMHG | DIASTOLIC BLOOD PRESSURE: 93 MMHG | SYSTOLIC BLOOD PRESSURE: 119 MMHG | RESPIRATION RATE: 20 BRPM | HEART RATE: 69 BPM | HEART RATE: 56 BPM | DIASTOLIC BLOOD PRESSURE: 60 MMHG | HEIGHT: 72 IN

## 2024-06-13 PROBLEM — Z86.010 SURVEILLANCE DUE TO PRIOR COLONIC NEOPLASIA: Status: ACTIVE | Noted: 2024-06-14

## 2024-06-14 ENCOUNTER — AMBULATORY SURGICAL CENTER (AMBULATORY)
Dept: URBAN - METROPOLITAN AREA SURGERY 17 | Facility: SURGERY | Age: 69
End: 2024-06-14

## 2024-06-14 ENCOUNTER — OFFICE (AMBULATORY)
Dept: URBAN - METROPOLITAN AREA PATHOLOGY 4 | Facility: PATHOLOGY | Age: 69
End: 2024-06-14
Payer: MEDICARE

## 2024-06-14 DIAGNOSIS — D12.5 BENIGN NEOPLASM OF SIGMOID COLON: ICD-10-CM

## 2024-06-14 DIAGNOSIS — Z86.010 PERSONAL HISTORY OF COLONIC POLYPS: ICD-10-CM

## 2024-06-14 DIAGNOSIS — Z09 ENCOUNTER FOR FOLLOW-UP EXAMINATION AFTER COMPLETED TREATMEN: ICD-10-CM

## 2024-06-14 DIAGNOSIS — D17.79 BENIGN LIPOMATOUS NEOPLASM OF OTHER SITES: ICD-10-CM

## 2024-06-14 DIAGNOSIS — K55.20 ANGIODYSPLASIA OF COLON WITHOUT HEMORRHAGE: ICD-10-CM

## 2024-06-14 LAB
GI HISTOLOGY: A. SIGMOID COLON: (no result)
GI HISTOLOGY: PDF REPORT: (no result)

## 2024-06-14 PROCEDURE — 45385 COLONOSCOPY W/LESION REMOVAL: CPT | Mod: PT | Performed by: INTERNAL MEDICINE

## 2024-06-14 PROCEDURE — 88305 TISSUE EXAM BY PATHOLOGIST: CPT | Performed by: PATHOLOGY

## 2024-06-14 NOTE — SERVICEHPINOTES
68-year-old gentleman without GI complaints.  Family history is negative.  However he has a personal history of adenomatous polyps and presents for a follow-up colonoscopy.

## 2025-04-23 ENCOUNTER — OFFICE VISIT (OUTPATIENT)
Dept: CARDIOLOGY | Facility: CLINIC | Age: 70
End: 2025-04-23
Payer: MEDICARE

## 2025-04-23 ENCOUNTER — TELEPHONE (OUTPATIENT)
Dept: CARDIOLOGY | Facility: CLINIC | Age: 70
End: 2025-04-23

## 2025-04-23 ENCOUNTER — LAB (OUTPATIENT)
Facility: HOSPITAL | Age: 70
End: 2025-04-23
Payer: MEDICARE

## 2025-04-23 VITALS
HEIGHT: 72 IN | BODY MASS INDEX: 25.87 KG/M2 | DIASTOLIC BLOOD PRESSURE: 53 MMHG | HEART RATE: 63 BPM | WEIGHT: 191 LBS | SYSTOLIC BLOOD PRESSURE: 122 MMHG

## 2025-04-23 DIAGNOSIS — I10 HYPERTENSION, ESSENTIAL: Chronic | ICD-10-CM

## 2025-04-23 DIAGNOSIS — E78.2 MIXED HYPERLIPIDEMIA: ICD-10-CM

## 2025-04-23 DIAGNOSIS — R07.2 CHEST PAIN, PRECORDIAL: Primary | ICD-10-CM

## 2025-04-23 LAB
ALBUMIN SERPL-MCNC: 4.6 G/DL (ref 3.5–5.2)
ALP SERPL-CCNC: 109 U/L (ref 39–117)
ALT SERPL W P-5'-P-CCNC: 39 U/L (ref 1–41)
ANION GAP SERPL CALCULATED.3IONS-SCNC: 10.4 MMOL/L (ref 5–15)
AST SERPL-CCNC: 27 U/L (ref 1–40)
BILIRUB CONJ SERPL-MCNC: 0.2 MG/DL (ref 0–0.3)
BILIRUB INDIRECT SERPL-MCNC: 0.4 MG/DL
BILIRUB SERPL-MCNC: 0.6 MG/DL (ref 0–1.2)
BUN SERPL-MCNC: 10 MG/DL (ref 8–23)
BUN/CREAT SERPL: 13.2 (ref 7–25)
CALCIUM SPEC-SCNC: 9.8 MG/DL (ref 8.6–10.5)
CHLORIDE SERPL-SCNC: 101 MMOL/L (ref 98–107)
CHOLEST SERPL-MCNC: 164 MG/DL (ref 0–200)
CO2 SERPL-SCNC: 25.6 MMOL/L (ref 22–29)
CREAT SERPL-MCNC: 0.76 MG/DL (ref 0.76–1.27)
EGFRCR SERPLBLD CKD-EPI 2021: 96.1 ML/MIN/1.73
GLUCOSE SERPL-MCNC: 100 MG/DL (ref 65–99)
HDLC SERPL-MCNC: 45 MG/DL (ref 40–60)
LDLC SERPL CALC-MCNC: 97 MG/DL (ref 0–100)
LDLC/HDLC SERPL: 2.09 {RATIO}
POTASSIUM SERPL-SCNC: 5.1 MMOL/L (ref 3.5–5.2)
PROT SERPL-MCNC: 7.5 G/DL (ref 6–8.5)
SODIUM SERPL-SCNC: 137 MMOL/L (ref 136–145)
TRIGL SERPL-MCNC: 125 MG/DL (ref 0–150)
VLDLC SERPL-MCNC: 22 MG/DL (ref 5–40)

## 2025-04-23 PROCEDURE — 80076 HEPATIC FUNCTION PANEL: CPT

## 2025-04-23 PROCEDURE — 80061 LIPID PANEL: CPT

## 2025-04-23 PROCEDURE — 36415 COLL VENOUS BLD VENIPUNCTURE: CPT

## 2025-04-23 PROCEDURE — 80048 BASIC METABOLIC PNL TOTAL CA: CPT

## 2025-04-23 RX ORDER — LORATADINE 10 MG/1
10 TABLET ORAL DAILY
COMMUNITY

## 2025-04-23 RX ORDER — FLUTICASONE PROPIONATE 50 MCG
1 SPRAY, SUSPENSION (ML) NASAL DAILY
COMMUNITY
Start: 2024-10-21 | End: 2025-10-22

## 2025-04-23 NOTE — ASSESSMENT & PLAN NOTE
Patient has been experiencing episodes to lasting for a few hours of epigastric to substernal chest discomfort issues does have a burning-like quality these are nonexertional given his risk factors for coronary artery disease and known peripheral artery disease we will recommend go ahead and checking a noninvasive stress test to make sure no ischemic issues.  Would encourage preventatively aspirin 81 mg once a day

## 2025-04-23 NOTE — PROGRESS NOTES
Chief Complaint  Hypertension, Hyperlipidemia, and Heartburn    Subjective    Patient has not been experiencing any change in breathing ability but has noticed some episodes of epigastric to substernal chest pain described as a burning sensation lasting for couple hours not exertional in nature.  He did not report any other associated symptoms or aggravating factors.    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/18/2019    Prostate cancer     Renal cyst, left     Sinus trouble     Vitamin D deficiency          Current Outpatient Medications:     Acetaminophen (TYLENOL ARTHRITIS PAIN PO), Take  by mouth., Disp: , Rfl:     ascorbic acid (VITAMIN C) 250 MG tablet, Take 1 tablet by mouth Daily., Disp: , Rfl:     atorvastatin (LIPITOR) 40 MG tablet, TAKE 1 TABLET DAILY, Disp: 90 tablet, Rfl: 3    carvedilol (COREG) 12.5 MG tablet, Take 1 tablet by mouth 2 (Two) Times a Day With Meals., Disp: 180 tablet, Rfl: 3    fluticasone (FLONASE) 50 MCG/ACT nasal spray, Administer 1 spray into the nostril(s) as directed by provider Daily., Disp: , Rfl:     furosemide (LASIX) 20 MG tablet, Take 1 tablet by mouth Daily. Pt has been taking 40 mg daily, Disp: , Rfl:     loratadine (CLARITIN) 10 MG tablet, Take 1 tablet by mouth Daily., Disp: , Rfl:     losartan (Cozaar) 25 MG tablet, Take 1 tablet by mouth Daily., Disp: 90 tablet, Rfl: 3    NIFEdipine XL (PROCARDIA XL) 60 MG 24 hr tablet, Take 1 tablet by mouth Daily., Disp: , Rfl:     VITAMIN D PO, Take  by mouth., Disp: , Rfl:     Medications Discontinued During This Encounter   Medication Reason    cetirizine (zyrTEC) 10 MG tablet *Therapy completed    pentoxifylline (TRENtal) 400 MG CR tablet *Therapy completed     No Known Allergies     Social History     Tobacco Use     "Smoking status: Former     Current packs/day: 2.00     Types: Cigarettes    Smokeless tobacco: Never    Tobacco comments:     Smoked 30 years   Vaping Use    Vaping status: Never Used   Substance Use Topics    Alcohol use: Not Currently     Comment: Former    Drug use: Never       Family History   Problem Relation Age of Onset    Stroke Mother     Heart disease Mother     Cirrhosis Brother         Cirrosis of liver    Alcohol abuse Other         Alcoholism in family        Objective     /53   Pulse 63   Ht 182.9 cm (72\")   Wt 86.6 kg (191 lb)   BMI 25.90 kg/m²       Physical Exam    General Appearance:   no acute distress  Alert and oriented x3  HENT:   lips not cyanotic  Atraumatic  Neck:  No jvd   supple  Respiratory:  no respiratory distress  normal breath sounds  no rales  Cardiovascular:  Regular rate and rhythm  no S3, no S4   no murmur  no rub  Extremities  No cyanosis  lower extremity edema: none    Skin:   warm, dry  No rashes      Result Review :     No results found for: \"PROBNP\"       Lab Results   Component Value Date    TSH 1.490 06/17/2019      No results found for: \"FREET4\"   No results found for: \"DDIMERQUANT\"  No results found for: \"MG\"   No results found for: \"DIGOXIN\"   No results found for: \"TROPONINT\"          No results found for: \"POCTROP\"         ECG 12 Lead    Date/Time: 4/23/2025 10:22 AM  Performed by: Ebenezer Rae MD    Authorized by: Ebenezer Rae MD  Comparison: compared with previous ECG   Similar to previous ECG  Rhythm: sinus rhythm  Ectopy: atrial premature contractions                 Diagnoses and all orders for this visit:    1. Chest pain, precordial (Primary)  Assessment & Plan:  Patient has been experiencing episodes to lasting for a few hours of epigastric to substernal chest discomfort issues does have a burning-like quality these are nonexertional given his risk factors for coronary artery disease and known peripheral artery disease we will recommend go ahead " and checking a noninvasive stress test to make sure no ischemic issues.  Would encourage preventatively aspirin 81 mg once a day    Orders:  -     Stress Test With Myocardial Perfusion One Day; Future  -     ECG 12 Lead    2. Mixed hyperlipidemia  Assessment & Plan:  Is on Lipitor 40 nightly no myalgias we will repeat his lipids to see if at goal     Orders:  -     Hepatic Function Panel; Future  -     Lipid Panel; Future  -     Basic Metabolic Panel; Future  -     Stress Test With Myocardial Perfusion One Day; Future    3. Hypertension, essential  Assessment & Plan:  Continue with Coreg 25 twice daily and losartan 25 twice daily for management               Follow Up     Return in about 1 year (around 4/23/2026) for Follow with Lora Bernardo.          Patient was given instructions and counseling regarding his condition or for health maintenance advice. Please see specific information pulled into the AVS if appropriate.

## 2025-04-23 NOTE — TELEPHONE ENCOUNTER
Spoke with pt and adv per Dr. Rae :                       Pt verb understanding and no questions

## 2025-04-24 ENCOUNTER — RESULTS FOLLOW-UP (OUTPATIENT)
Dept: CARDIOLOGY | Facility: CLINIC | Age: 70
End: 2025-04-24
Payer: MEDICARE

## 2025-04-30 ENCOUNTER — HOSPITAL ENCOUNTER (OUTPATIENT)
Facility: HOSPITAL | Age: 70
Discharge: HOME | End: 2025-04-30
Admitting: INTERNAL MEDICINE
Payer: MEDICARE

## 2025-04-30 DIAGNOSIS — R07.2 CHEST PAIN, PRECORDIAL: ICD-10-CM

## 2025-04-30 DIAGNOSIS — E78.2 MIXED HYPERLIPIDEMIA: ICD-10-CM

## 2025-04-30 PROCEDURE — 34310000005 TECHNETIUM TETROFOSMIN KIT: Performed by: INTERNAL MEDICINE

## 2025-04-30 PROCEDURE — A9502 TC99M TETROFOSMIN: HCPCS | Performed by: INTERNAL MEDICINE

## 2025-04-30 PROCEDURE — 93017 CV STRESS TEST TRACING ONLY: CPT

## 2025-04-30 PROCEDURE — 25010000002 REGADENOSON 0.4 MG/5ML SOLUTION: Performed by: INTERNAL MEDICINE

## 2025-04-30 PROCEDURE — 78452 HT MUSCLE IMAGE SPECT MULT: CPT

## 2025-04-30 RX ORDER — REGADENOSON 0.08 MG/ML
0.4 INJECTION, SOLUTION INTRAVENOUS
Status: DISCONTINUED | OUTPATIENT
Start: 2025-04-30 | End: 2025-05-01 | Stop reason: HOSPADM

## 2025-04-30 RX ADMIN — TETROFOSMIN 1 DOSE: 1.38 INJECTION, POWDER, LYOPHILIZED, FOR SOLUTION INTRAVENOUS at 11:46

## 2025-04-30 RX ADMIN — TETROFOSMIN 1 DOSE: 1.38 INJECTION, POWDER, LYOPHILIZED, FOR SOLUTION INTRAVENOUS at 09:34

## 2025-05-04 LAB
BH CV IMMEDIATE POST RECOVERY TECH DATA SYMPTOMS: NORMAL
BH CV IMMEDIATE POST TECH DATA BLOOD PRESSURE: NORMAL MMHG
BH CV IMMEDIATE POST TECH DATA HEART RATE: 102 BPM
BH CV IMMEDIATE POST TECH DATA OXYGEN SATS: 98 %
BH CV NINE MINUTE RECOVERY TECH DATA BLOOD PRESSURE: NORMAL MMHG
BH CV NINE MINUTE RECOVERY TECH DATA HEART RATE: 76 BPM
BH CV NINE MINUTE RECOVERY TECH DATA OXYGEN SATURATION: 98 %
BH CV NINE MINUTE RECOVERY TECH DATA SYMPTOMS: NORMAL
BH CV REST NUCLEAR ISOTOPE DOSE: 9.9 MCI
BH CV SIX MINUTE RECOVERY TECH DATA BLOOD PRESSURE: NORMAL
BH CV SIX MINUTE RECOVERY TECH DATA HEART RATE: 85 BPM
BH CV SIX MINUTE RECOVERY TECH DATA OXYGEN SATURATION: 98 %
BH CV SIX MINUTE RECOVERY TECH DATA SYMPTOMS: NORMAL
BH CV STRESS BP STAGE 1: NORMAL
BH CV STRESS BP STAGE 2: NORMAL
BH CV STRESS BP STAGE 3: NORMAL
BH CV STRESS BP STAGE 4: NORMAL
BH CV STRESS DURATION MIN STAGE 1: 3
BH CV STRESS DURATION MIN STAGE 2: 3
BH CV STRESS DURATION MIN STAGE 3: 3
BH CV STRESS DURATION MIN STAGE 4: 3
BH CV STRESS DURATION SEC STAGE 1: 0
BH CV STRESS DURATION SEC STAGE 2: 0
BH CV STRESS DURATION SEC STAGE 3: 0
BH CV STRESS DURATION SEC STAGE 4: 0
BH CV STRESS GRADE STAGE 1: 0
BH CV STRESS GRADE STAGE 2: 5
BH CV STRESS GRADE STAGE 3: 10
BH CV STRESS GRADE STAGE 4: 12
BH CV STRESS HR STAGE 1: 77
BH CV STRESS HR STAGE 2: 94
BH CV STRESS HR STAGE 3: 112
BH CV STRESS HR STAGE 4: 120
BH CV STRESS METS STAGE 1: 2.3
BH CV STRESS METS STAGE 2: 3.5
BH CV STRESS METS STAGE 3: 4.6
BH CV STRESS METS STAGE 4: 7
BH CV STRESS NUCLEAR ISOTOPE DOSE: 33.8 MCI
BH CV STRESS O2 STAGE 1: 98
BH CV STRESS O2 STAGE 2: 98
BH CV STRESS O2 STAGE 4: 98
BH CV STRESS PROTOCOL 1: NORMAL
BH CV STRESS RECOVERY BP: NORMAL MMHG
BH CV STRESS RECOVERY HR: 76 BPM
BH CV STRESS RECOVERY O2: 98 %
BH CV STRESS SPEED STAGE 1: 1.7
BH CV STRESS SPEED STAGE 2: 2.5
BH CV STRESS SPEED STAGE 3: 1.7
BH CV STRESS SPEED STAGE 4: 3.4
BH CV STRESS STAGE 1: 1
BH CV STRESS STAGE 2: 2
BH CV STRESS STAGE 3: 3
BH CV STRESS STAGE 4: 4
BH CV THREE MINUTE POST TECH DATA BLOOD PRESSURE: NORMAL MMHG
BH CV THREE MINUTE POST TECH DATA HEART RATE: 87 BPM
BH CV THREE MINUTE POST TECH DATA OXYGEN SATURATION: 98 %
BH CV THREE MINUTE RECOVERY TECH DATA SYMPTOM: NORMAL
MAXIMAL PREDICTED HEART RATE: 149 BPM
PERCENT MAX PREDICTED HR: 81.21 %
SPECT HRT GATED+EF W RNC IV: 61 %
STRESS BASELINE BP: NORMAL MMHG
STRESS BASELINE HR: 63 BPM
STRESS O2 SAT REST: 96 %
STRESS PERCENT HR: 96 %
STRESS POST ESTIMATED WORKLOAD: 9.1 METS
STRESS POST EXERCISE DUR MIN: 10 MIN
STRESS POST EXERCISE DUR SEC: 44 SEC
STRESS POST O2 SAT PEAK: 98 %
STRESS POST PEAK BP: NORMAL MMHG
STRESS POST PEAK HR: 121 BPM
STRESS TARGET HR: 127 BPM

## 2025-05-05 NOTE — TELEPHONE ENCOUNTER
Per Lora:  Stress test shows no sign of blockage in coronary vessels. Follow up as scheduled. Notify office of any new/worsening cardiac symptoms.    Spoke with patient, patient is aware and verbalized understanding.